# Patient Record
Sex: FEMALE | Race: WHITE | NOT HISPANIC OR LATINO | URBAN - METROPOLITAN AREA
[De-identification: names, ages, dates, MRNs, and addresses within clinical notes are randomized per-mention and may not be internally consistent; named-entity substitution may affect disease eponyms.]

---

## 2018-10-26 ENCOUNTER — EMERGENCY (EMERGENCY)
Facility: HOSPITAL | Age: 53
LOS: 0 days | Discharge: HOME | End: 2018-10-26
Attending: EMERGENCY MEDICINE | Admitting: EMERGENCY MEDICINE

## 2018-10-26 VITALS
TEMPERATURE: 98 F | HEART RATE: 78 BPM | RESPIRATION RATE: 20 BRPM | OXYGEN SATURATION: 100 % | SYSTOLIC BLOOD PRESSURE: 148 MMHG | DIASTOLIC BLOOD PRESSURE: 98 MMHG

## 2018-10-26 VITALS
HEART RATE: 86 BPM | RESPIRATION RATE: 19 BRPM | HEIGHT: 64 IN | WEIGHT: 177.03 LBS | SYSTOLIC BLOOD PRESSURE: 175 MMHG | DIASTOLIC BLOOD PRESSURE: 102 MMHG | TEMPERATURE: 96 F

## 2018-10-26 DIAGNOSIS — E11.9 TYPE 2 DIABETES MELLITUS WITHOUT COMPLICATIONS: ICD-10-CM

## 2018-10-26 DIAGNOSIS — I10 ESSENTIAL (PRIMARY) HYPERTENSION: ICD-10-CM

## 2018-10-26 DIAGNOSIS — Z98.890 OTHER SPECIFIED POSTPROCEDURAL STATES: Chronic | ICD-10-CM

## 2018-10-26 DIAGNOSIS — Z98.890 OTHER SPECIFIED POSTPROCEDURAL STATES: ICD-10-CM

## 2018-10-26 DIAGNOSIS — M54.42 LUMBAGO WITH SCIATICA, LEFT SIDE: ICD-10-CM

## 2018-10-26 DIAGNOSIS — Z79.84 LONG TERM (CURRENT) USE OF ORAL HYPOGLYCEMIC DRUGS: ICD-10-CM

## 2018-10-26 DIAGNOSIS — E03.9 HYPOTHYROIDISM, UNSPECIFIED: ICD-10-CM

## 2018-10-26 DIAGNOSIS — E78.00 PURE HYPERCHOLESTEROLEMIA, UNSPECIFIED: ICD-10-CM

## 2018-10-26 DIAGNOSIS — M53.3 SACROCOCCYGEAL DISORDERS, NOT ELSEWHERE CLASSIFIED: ICD-10-CM

## 2018-10-26 DIAGNOSIS — Z79.899 OTHER LONG TERM (CURRENT) DRUG THERAPY: ICD-10-CM

## 2018-10-26 LAB
APPEARANCE UR: ABNORMAL
BACTERIA # UR AUTO: ABNORMAL
BILIRUB UR-MCNC: NEGATIVE — SIGNIFICANT CHANGE UP
COLOR SPEC: YELLOW — SIGNIFICANT CHANGE UP
COMMENT - URINE: SIGNIFICANT CHANGE UP
DIFF PNL FLD: ABNORMAL
EPI CELLS # UR: ABNORMAL /HPF
GLUCOSE UR QL: 500 MG/DL
GRAN CASTS # UR COMP ASSIST: SIGNIFICANT CHANGE UP /LPF
HYALINE CASTS # UR AUTO: ABNORMAL /LPF
KETONES UR-MCNC: NEGATIVE — SIGNIFICANT CHANGE UP
LEUKOCYTE ESTERASE UR-ACNC: NEGATIVE — SIGNIFICANT CHANGE UP
NITRITE UR-MCNC: NEGATIVE — SIGNIFICANT CHANGE UP
PH UR: 5.5 — SIGNIFICANT CHANGE UP (ref 5–8)
PROT UR-MCNC: 30 MG/DL
RBC CASTS # UR COMP ASSIST: SIGNIFICANT CHANGE UP /HPF
SP GR SPEC: >=1.03 (ref 1.01–1.03)
UROBILINOGEN FLD QL: 0.2 MG/DL — SIGNIFICANT CHANGE UP (ref 0.2–0.2)

## 2018-10-26 RX ORDER — DEXAMETHASONE 0.5 MG/5ML
10 ELIXIR ORAL ONCE
Refills: 0 | Status: COMPLETED | OUTPATIENT
Start: 2018-10-26 | End: 2018-10-26

## 2018-10-26 RX ORDER — TIZANIDINE 4 MG/1
1 TABLET ORAL
Qty: 18 | Refills: 0
Start: 2018-10-26 | End: 2018-10-31

## 2018-10-26 RX ORDER — METHOCARBAMOL 500 MG/1
1000 TABLET, FILM COATED ORAL ONCE
Refills: 0 | Status: COMPLETED | OUTPATIENT
Start: 2018-10-26 | End: 2018-10-26

## 2018-10-26 RX ORDER — KETOROLAC TROMETHAMINE 30 MG/ML
15 SYRINGE (ML) INJECTION ONCE
Refills: 0 | Status: DISCONTINUED | OUTPATIENT
Start: 2018-10-26 | End: 2018-10-26

## 2018-10-26 RX ADMIN — Medication 15 MILLIGRAM(S): at 03:44

## 2018-10-26 RX ADMIN — METHOCARBAMOL 1000 MILLIGRAM(S): 500 TABLET, FILM COATED ORAL at 04:43

## 2018-10-26 RX ADMIN — Medication 10 MILLIGRAM(S): at 03:44

## 2018-10-26 NOTE — ED PROVIDER NOTE - PHYSICAL EXAMINATION
Physical Exam    Vital Signs: I have reviewed the initial vital signs.  Constitutional: well-nourished, appears stated age, no acute distress  Cardiovascular: regular rate, regular rhythm, well-perfused extremities  Respiratory: unlabored respiratory effort, clear to auscultation bilaterally  Musculoskeletal: supple neck, no lower extremity edema. Raising left leg reproduces symptoms with shooting electric like.  Integumentary: warm, dry, no rash  Neurologic: Extremities’ motor and sensory functions grossly intact. Able to ambulate with no gait deficits.

## 2018-10-26 NOTE — ED ADULT NURSE NOTE - PRIMARY CARE PROVIDER
Problem: Safety  Goal: Will remain free from injury    Intervention: Provide assistance with mobility  Pt mobility assessed at beginning of shift, pt requires standby assist to bathroom, steady, WBAT on left arm.  Fall precautions in place, non-slip socks on, bed in lowest, locked position and call light is within reach.  Pt educated to call for assistance and verbalizes understanding.       Problem: Pain Management  Goal: Pain level will decrease to patient's comfort goal    Intervention: Follow pain managment plan developed in collaboration with patient and Interdisciplinary Team  Pain assessment completed, pharmacologic and non-pharmacologic pain management methods in use: rest, repositioned, distraction.            calin

## 2018-10-26 NOTE — ED PROVIDER NOTE - ATTENDING CONTRIBUTION TO CARE
53y female with atraumatic left buttock pain radiating down posterolateral thigh to knee, no numbness/weakness, no bowel/bladder symptoms, on exam ambulatory with antalgic gait, +left sciatic notch ttp, neg slr, abd snt no cvat pulses equal neuro intact, will treat pain, pmd/rehab f/u. Patient counseled regarding conditions which should prompt return.

## 2018-10-26 NOTE — ED PROVIDER NOTE - NS ED ROS FT
Review of Systems    Constitutional: (-) fever (-) weakness (-) diaphoresis   Cardiovascular: (-) chest pain  (-) palpitations  Respiratory: (-) SOB (-) cough   GI: (-) abdominal pain (-) N/V (-) diarrhea  Integumentary: (-) rash (-) redness   : SEE HPI

## 2018-10-31 ENCOUNTER — OUTPATIENT (OUTPATIENT)
Dept: OUTPATIENT SERVICES | Facility: HOSPITAL | Age: 53
LOS: 1 days | Discharge: HOME | End: 2018-10-31

## 2018-10-31 DIAGNOSIS — Z98.890 OTHER SPECIFIED POSTPROCEDURAL STATES: Chronic | ICD-10-CM

## 2018-10-31 DIAGNOSIS — M54.5 LOW BACK PAIN: ICD-10-CM

## 2019-05-28 PROBLEM — Z00.00 ENCOUNTER FOR PREVENTIVE HEALTH EXAMINATION: Noted: 2019-05-28

## 2019-06-06 ENCOUNTER — RECORD ABSTRACTING (OUTPATIENT)
Age: 54
End: 2019-06-06

## 2019-06-06 DIAGNOSIS — Z86.39 PERSONAL HISTORY OF OTHER ENDOCRINE, NUTRITIONAL AND METABOLIC DISEASE: ICD-10-CM

## 2019-06-06 DIAGNOSIS — Z85.850 PERSONAL HISTORY OF MALIGNANT NEOPLASM OF THYROID: ICD-10-CM

## 2019-06-06 DIAGNOSIS — Z86.69 PERSONAL HISTORY OF OTHER DISEASES OF THE NERVOUS SYSTEM AND SENSE ORGANS: ICD-10-CM

## 2019-06-06 DIAGNOSIS — F17.200 NICOTINE DEPENDENCE, UNSPECIFIED, UNCOMPLICATED: ICD-10-CM

## 2019-06-06 RX ORDER — ERGOCALCIFEROL (VITAMIN D2) 1250 MCG
50000 CAPSULE ORAL
Refills: 0 | Status: ACTIVE | COMMUNITY

## 2019-06-27 ENCOUNTER — APPOINTMENT (OUTPATIENT)
Dept: ENDOCRINOLOGY | Facility: CLINIC | Age: 54
End: 2019-06-27

## 2019-07-11 ENCOUNTER — APPOINTMENT (OUTPATIENT)
Dept: ENDOCRINOLOGY | Facility: CLINIC | Age: 54
End: 2019-07-11
Payer: COMMERCIAL

## 2019-07-11 VITALS
SYSTOLIC BLOOD PRESSURE: 122 MMHG | WEIGHT: 182 LBS | DIASTOLIC BLOOD PRESSURE: 72 MMHG | HEIGHT: 64 IN | HEART RATE: 92 BPM | OXYGEN SATURATION: 97 % | BODY MASS INDEX: 31.07 KG/M2

## 2019-07-11 PROCEDURE — 99214 OFFICE O/P EST MOD 30 MIN: CPT

## 2019-07-11 NOTE — ASSESSMENT
[FreeTextEntry1] :  Pt. has stable low reading for thyroglobulin level. TSH is mildly suppressed with normal T4 and T3 and pt. is clinically euthyroid. Risks of subclinical hyperthyroidism (BMD, Cardiac etc.) have been discussed in detail and pt. is aware. Thyroid U/S has been negative. Will continue with current dose and follow thyroglobulin level and TFTs.\par \par  Pt. seen and examined and had discussion regarding diabetic complication risks (eye exam, examining feet). Labs were fully reviewed with pt. including FBS, HbA1c, and micro albumin and lipids. \par \par Values are adequate and no new symptoms of concern (discussed neuropathy symptoms, C/p, cardiac issues and GI). To stay with current regimen. \par

## 2019-08-10 ENCOUNTER — APPOINTMENT (OUTPATIENT)
Dept: ENDOCRINOLOGY | Facility: CLINIC | Age: 54
End: 2019-08-10
Payer: COMMERCIAL

## 2019-08-10 VITALS
BODY MASS INDEX: 30.98 KG/M2 | WEIGHT: 181.44 LBS | HEIGHT: 64 IN | SYSTOLIC BLOOD PRESSURE: 120 MMHG | HEART RATE: 82 BPM | DIASTOLIC BLOOD PRESSURE: 79 MMHG

## 2019-08-10 PROCEDURE — 99214 OFFICE O/P EST MOD 30 MIN: CPT

## 2019-08-10 NOTE — ASSESSMENT
[FreeTextEntry1] : \par \par  Pt. seen and examined and had discussion regarding diabetic complication risks (eye exam, examining feet). Labs were fully reviewed with pt. including FBS, HbA1c, and micro albumin and lipids. \par Values are adequate and no new symptoms of concern (discussed neuropathy symptoms, C/p, cardiac issues and GI). To stay with current regimen. \par  Pt. has stable low reading for thyroglobulin level. TSH is mildly suppressed with normal T4 and T3 and pt. is clinically euthyroid. Risks of subclinical hyperthyroidism (BMD, Cardiac etc.) have been discussed in detail and pt. is aware. Thyroid U/S has been negative. Will continue with current dose and follow thyroglobulin level and TFTs.

## 2019-08-10 NOTE — PHYSICAL EXAM
[Alert] : alert [No Acute Distress] : no acute distress [Well Developed] : well developed [Well Nourished] : well nourished [EOMI] : extra ocular movement intact [Normal Sclera/Conjunctiva] : normal sclera/conjunctiva [No Proptosis] : no proptosis [Normal Oropharynx] : the oropharynx was normal [No Thyroid Nodules] : there were no palpable thyroid nodules [Thyroid Not Enlarged] : the thyroid was not enlarged [No Accessory Muscle Use] : no accessory muscle use [No Respiratory Distress] : no respiratory distress [Clear to Auscultation] : lungs were clear to auscultation bilaterally [Normal Rate] : heart rate was normal  [Normal S1, S2] : normal S1 and S2 [Regular Rhythm] : with a regular rhythm [Pedal Pulses Normal] : the pedal pulses are present [No Edema] : there was no peripheral edema [Normal Bowel Sounds] : normal bowel sounds [Not Tender] : non-tender [Not Distended] : not distended [Soft] : abdomen soft [Post Cervical Nodes] : posterior cervical nodes [Anterior Cervical Nodes] : anterior cervical nodes [Axillary Nodes] : axillary nodes [No Spinal Tenderness] : no spinal tenderness [Normal] : normal and non tender [No Stigmata of Cushings Syndrome] : no stigmata of cushings syndrome [Spine Straight] : spine straight [Normal Strength/Tone] : muscle strength and tone were normal [Normal Gait] : normal gait [No Rash] : no rash [Normal Reflexes] : deep tendon reflexes were 2+ and symmetric [No Tremors] : no tremors [Oriented x3] : oriented to person, place, and time [Acanthosis Nigricans] : no acanthosis nigricans

## 2019-08-10 NOTE — HISTORY OF PRESENT ILLNESS
[FreeTextEntry1] : Pt main concern is her hair loss. Her primary lowered synthroid to .137 but no change. Pt got Bx from [facility] and await result but aooears like Alopecia Areata. Pt already with vitiiligo.

## 2020-02-06 ENCOUNTER — APPOINTMENT (OUTPATIENT)
Dept: ENDOCRINOLOGY | Facility: CLINIC | Age: 55
End: 2020-02-06
Payer: MEDICARE

## 2020-02-06 VITALS
HEIGHT: 64 IN | SYSTOLIC BLOOD PRESSURE: 120 MMHG | DIASTOLIC BLOOD PRESSURE: 72 MMHG | OXYGEN SATURATION: 97 % | HEART RATE: 86 BPM | WEIGHT: 184 LBS | BODY MASS INDEX: 31.41 KG/M2

## 2020-02-06 PROCEDURE — 99214 OFFICE O/P EST MOD 30 MIN: CPT

## 2020-04-09 NOTE — PHYSICAL EXAM
[Alert] : alert [Well Nourished] : well nourished [No Acute Distress] : no acute distress [Well Developed] : well developed [EOMI] : extra ocular movement intact [Normal Sclera/Conjunctiva] : normal sclera/conjunctiva [Normal Oropharynx] : the oropharynx was normal [No Proptosis] : no proptosis [Thyroid Not Enlarged] : the thyroid was not enlarged [No Respiratory Distress] : no respiratory distress [No Thyroid Nodules] : there were no palpable thyroid nodules [No Accessory Muscle Use] : no accessory muscle use [Clear to Auscultation] : lungs were clear to auscultation bilaterally [Normal Rate] : heart rate was normal  [Normal S1, S2] : normal S1 and S2 [Regular Rhythm] : with a regular rhythm [Pedal Pulses Normal] : the pedal pulses are present [No Edema] : there was no peripheral edema [Normal Bowel Sounds] : normal bowel sounds [Soft] : abdomen soft [Not Distended] : not distended [Not Tender] : non-tender [Anterior Cervical Nodes] : anterior cervical nodes [Post Cervical Nodes] : posterior cervical nodes [Normal] : normal and non tender [Axillary Nodes] : axillary nodes [No Spinal Tenderness] : no spinal tenderness [No Stigmata of Cushings Syndrome] : no stigmata of cushings syndrome [Spine Straight] : spine straight [Normal Strength/Tone] : muscle strength and tone were normal [Normal Gait] : normal gait [No Rash] : no rash [Normal Reflexes] : deep tendon reflexes were 2+ and symmetric [No Tremors] : no tremors [Oriented x3] : oriented to person, place, and time [Acanthosis Nigricans] : no acanthosis nigricans

## 2020-04-09 NOTE — ASSESSMENT
[FreeTextEntry1] : Pt. has stable low reading for thyroglobulin level but not done on this lab so will need to separately order.TSH is low normal and would prefer mild suppression with normal T4 and T3 and pt. is clinically euthyroid. Risks of subclinical hyperthyroidism (BMD, Cardiac etc.) have been discussed in detail and pt. is aware. Thyroid U/S has new nodule in the bed on the left so will need FNA.. Will continue with current dose and follow thyroglobulin level and TFTs.\par Diabetes control is poor. suggest BID glimepiride.

## 2020-08-06 ENCOUNTER — APPOINTMENT (OUTPATIENT)
Dept: ENDOCRINOLOGY | Facility: CLINIC | Age: 55
End: 2020-08-06

## 2020-10-26 ENCOUNTER — APPOINTMENT (OUTPATIENT)
Dept: ENDOCRINOLOGY | Facility: CLINIC | Age: 55
End: 2020-10-26
Payer: MEDICARE

## 2020-10-26 VITALS
BODY MASS INDEX: 32.27 KG/M2 | HEART RATE: 8 BPM | TEMPERATURE: 97.2 F | DIASTOLIC BLOOD PRESSURE: 72 MMHG | OXYGEN SATURATION: 98 % | SYSTOLIC BLOOD PRESSURE: 122 MMHG | WEIGHT: 189 LBS | HEIGHT: 64 IN

## 2020-10-26 PROCEDURE — 99214 OFFICE O/P EST MOD 30 MIN: CPT

## 2020-10-26 RX ORDER — LISINOPRIL 5 MG/1
5 TABLET ORAL
Qty: 30 | Refills: 0 | Status: ACTIVE | COMMUNITY
Start: 2020-07-20

## 2020-10-26 RX ORDER — ATORVASTATIN CALCIUM 20 MG/1
20 TABLET, FILM COATED ORAL
Qty: 30 | Refills: 0 | Status: ACTIVE | COMMUNITY
Start: 2020-09-22

## 2020-10-26 NOTE — ASSESSMENT
[FreeTextEntry1] : The pt. has history of thyroid carcinoma with total thyroidectomy. Pt. has been followed with thyroglobulin with most recent level  1.0 and imaging with U/S (2/6/2020 which showed new growth in L bed of area of previous partial thyroidectomy and then recheck on 220/2020 which did not reveal anything that could be biopsied. . . Goal has been to suppress TSH with normal T4 and T3 and clinical euthyroid state. Risks of subclinical hyperthyroid reviewed in great detail including BMD and cardiac issues.\par

## 2020-12-08 ENCOUNTER — RESULT REVIEW (OUTPATIENT)
Age: 55
End: 2020-12-08

## 2021-01-03 ENCOUNTER — INPATIENT (INPATIENT)
Facility: HOSPITAL | Age: 56
LOS: 3 days | Discharge: HOME | End: 2021-01-07
Attending: SURGERY | Admitting: SURGERY
Payer: MEDICARE

## 2021-01-03 VITALS
DIASTOLIC BLOOD PRESSURE: 90 MMHG | WEIGHT: 182.98 LBS | HEIGHT: 64 IN | RESPIRATION RATE: 18 BRPM | SYSTOLIC BLOOD PRESSURE: 190 MMHG | HEART RATE: 91 BPM | OXYGEN SATURATION: 99 % | TEMPERATURE: 98 F

## 2021-01-03 DIAGNOSIS — Z98.890 OTHER SPECIFIED POSTPROCEDURAL STATES: Chronic | ICD-10-CM

## 2021-01-03 LAB
ALBUMIN SERPL ELPH-MCNC: 4.6 G/DL — SIGNIFICANT CHANGE UP (ref 3.5–5.2)
ALP SERPL-CCNC: 74 U/L — SIGNIFICANT CHANGE UP (ref 30–115)
ALT FLD-CCNC: 35 U/L — SIGNIFICANT CHANGE UP (ref 0–41)
ANION GAP SERPL CALC-SCNC: 13 MMOL/L — SIGNIFICANT CHANGE UP (ref 7–14)
APPEARANCE UR: CLEAR — SIGNIFICANT CHANGE UP
AST SERPL-CCNC: 29 U/L — SIGNIFICANT CHANGE UP (ref 0–41)
BACTERIA # UR AUTO: ABNORMAL /HPF
BASOPHILS # BLD AUTO: 0.05 K/UL — SIGNIFICANT CHANGE UP (ref 0–0.2)
BASOPHILS NFR BLD AUTO: 0.8 % — SIGNIFICANT CHANGE UP (ref 0–1)
BILIRUB DIRECT SERPL-MCNC: <0.2 MG/DL — SIGNIFICANT CHANGE UP (ref 0–0.2)
BILIRUB INDIRECT FLD-MCNC: >0.7 MG/DL — SIGNIFICANT CHANGE UP (ref 0.2–1.2)
BILIRUB SERPL-MCNC: 0.9 MG/DL — SIGNIFICANT CHANGE UP (ref 0.2–1.2)
BILIRUB UR-MCNC: NEGATIVE — SIGNIFICANT CHANGE UP
BUN SERPL-MCNC: 21 MG/DL — HIGH (ref 10–20)
CALCIUM SERPL-MCNC: 9.8 MG/DL — SIGNIFICANT CHANGE UP (ref 8.5–10.1)
CHLORIDE SERPL-SCNC: 98 MMOL/L — SIGNIFICANT CHANGE UP (ref 98–110)
CO2 SERPL-SCNC: 28 MMOL/L — SIGNIFICANT CHANGE UP (ref 17–32)
COD CRY URNS QL: NEGATIVE — SIGNIFICANT CHANGE UP
COLOR SPEC: YELLOW — SIGNIFICANT CHANGE UP
CREAT SERPL-MCNC: 1.2 MG/DL — SIGNIFICANT CHANGE UP (ref 0.7–1.5)
DIFF PNL FLD: ABNORMAL
EOSINOPHIL # BLD AUTO: 0.2 K/UL — SIGNIFICANT CHANGE UP (ref 0–0.7)
EOSINOPHIL NFR BLD AUTO: 3.1 % — SIGNIFICANT CHANGE UP (ref 0–8)
EPI CELLS # UR: ABNORMAL /HPF
GLUCOSE SERPL-MCNC: 212 MG/DL — HIGH (ref 70–99)
GLUCOSE UR QL: NEGATIVE MG/DL — SIGNIFICANT CHANGE UP
GRAN CASTS # UR COMP ASSIST: NEGATIVE — SIGNIFICANT CHANGE UP
HCT VFR BLD CALC: 38.3 % — SIGNIFICANT CHANGE UP (ref 37–47)
HGB BLD-MCNC: 12.7 G/DL — SIGNIFICANT CHANGE UP (ref 12–16)
HYALINE CASTS # UR AUTO: NEGATIVE — SIGNIFICANT CHANGE UP
IMM GRANULOCYTES NFR BLD AUTO: 0.5 % — HIGH (ref 0.1–0.3)
KETONES UR-MCNC: NEGATIVE — SIGNIFICANT CHANGE UP
LACTATE SERPL-SCNC: 2.5 MMOL/L — HIGH (ref 0.7–2)
LEUKOCYTE ESTERASE UR-ACNC: NEGATIVE — SIGNIFICANT CHANGE UP
LIDOCAIN IGE QN: 30 U/L — SIGNIFICANT CHANGE UP (ref 7–60)
LYMPHOCYTES # BLD AUTO: 1.23 K/UL — SIGNIFICANT CHANGE UP (ref 1.2–3.4)
LYMPHOCYTES # BLD AUTO: 19.2 % — LOW (ref 20.5–51.1)
MCHC RBC-ENTMCNC: 28.3 PG — SIGNIFICANT CHANGE UP (ref 27–31)
MCHC RBC-ENTMCNC: 33.2 G/DL — SIGNIFICANT CHANGE UP (ref 32–37)
MCV RBC AUTO: 85.3 FL — SIGNIFICANT CHANGE UP (ref 81–99)
MONOCYTES # BLD AUTO: 0.48 K/UL — SIGNIFICANT CHANGE UP (ref 0.1–0.6)
MONOCYTES NFR BLD AUTO: 7.5 % — SIGNIFICANT CHANGE UP (ref 1.7–9.3)
NEUTROPHILS # BLD AUTO: 4.43 K/UL — SIGNIFICANT CHANGE UP (ref 1.4–6.5)
NEUTROPHILS NFR BLD AUTO: 68.9 % — SIGNIFICANT CHANGE UP (ref 42.2–75.2)
NITRITE UR-MCNC: NEGATIVE — SIGNIFICANT CHANGE UP
NRBC # BLD: 0 /100 WBCS — SIGNIFICANT CHANGE UP (ref 0–0)
PH UR: 5.5 — SIGNIFICANT CHANGE UP (ref 5–8)
PLATELET # BLD AUTO: 177 K/UL — SIGNIFICANT CHANGE UP (ref 130–400)
POTASSIUM SERPL-MCNC: 3.9 MMOL/L — SIGNIFICANT CHANGE UP (ref 3.5–5)
POTASSIUM SERPL-SCNC: 3.9 MMOL/L — SIGNIFICANT CHANGE UP (ref 3.5–5)
PROT SERPL-MCNC: 7.8 G/DL — SIGNIFICANT CHANGE UP (ref 6–8)
PROT UR-MCNC: 100 MG/DL
RBC # BLD: 4.49 M/UL — SIGNIFICANT CHANGE UP (ref 4.2–5.4)
RBC # FLD: 13.5 % — SIGNIFICANT CHANGE UP (ref 11.5–14.5)
RBC CASTS # UR COMP ASSIST: ABNORMAL /HPF
SODIUM SERPL-SCNC: 139 MMOL/L — SIGNIFICANT CHANGE UP (ref 135–146)
SP GR SPEC: >=1.03 (ref 1.01–1.03)
TRI-PHOS CRY UR QL COMP ASSIST: NEGATIVE — SIGNIFICANT CHANGE UP
TROPONIN T SERPL-MCNC: <0.01 NG/ML — SIGNIFICANT CHANGE UP
URATE CRY FLD QL MICRO: ABNORMAL /HPF
UROBILINOGEN FLD QL: 0.2 MG/DL — SIGNIFICANT CHANGE UP (ref 0.2–0.2)
WBC # BLD: 6.42 K/UL — SIGNIFICANT CHANGE UP (ref 4.8–10.8)
WBC # FLD AUTO: 6.42 K/UL — SIGNIFICANT CHANGE UP (ref 4.8–10.8)
WBC UR QL: SIGNIFICANT CHANGE UP /HPF

## 2021-01-03 PROCEDURE — 76705 ECHO EXAM OF ABDOMEN: CPT | Mod: 26

## 2021-01-03 PROCEDURE — 74177 CT ABD & PELVIS W/CONTRAST: CPT | Mod: 26

## 2021-01-03 PROCEDURE — 71046 X-RAY EXAM CHEST 2 VIEWS: CPT | Mod: 26

## 2021-01-03 PROCEDURE — 99285 EMERGENCY DEPT VISIT HI MDM: CPT

## 2021-01-03 RX ORDER — TAMSULOSIN HYDROCHLORIDE 0.4 MG/1
0.4 CAPSULE ORAL ONCE
Refills: 0 | Status: COMPLETED | OUTPATIENT
Start: 2021-01-03 | End: 2021-01-03

## 2021-01-03 RX ORDER — KETOROLAC TROMETHAMINE 30 MG/ML
30 SYRINGE (ML) INJECTION ONCE
Refills: 0 | Status: DISCONTINUED | OUTPATIENT
Start: 2021-01-03 | End: 2021-01-03

## 2021-01-03 RX ORDER — ONDANSETRON 8 MG/1
4 TABLET, FILM COATED ORAL ONCE
Refills: 0 | Status: COMPLETED | OUTPATIENT
Start: 2021-01-03 | End: 2021-01-03

## 2021-01-03 RX ORDER — CEFTRIAXONE 500 MG/1
1000 INJECTION, POWDER, FOR SOLUTION INTRAMUSCULAR; INTRAVENOUS ONCE
Refills: 0 | Status: COMPLETED | OUTPATIENT
Start: 2021-01-03 | End: 2021-01-03

## 2021-01-03 RX ORDER — MORPHINE SULFATE 50 MG/1
4 CAPSULE, EXTENDED RELEASE ORAL ONCE
Refills: 0 | Status: DISCONTINUED | OUTPATIENT
Start: 2021-01-03 | End: 2021-01-03

## 2021-01-03 RX ADMIN — Medication 30 MILLIGRAM(S): at 20:28

## 2021-01-03 RX ADMIN — CEFTRIAXONE 1000 MILLIGRAM(S): 500 INJECTION, POWDER, FOR SOLUTION INTRAMUSCULAR; INTRAVENOUS at 20:30

## 2021-01-03 RX ADMIN — Medication 30 MILLIGRAM(S): at 18:42

## 2021-01-03 RX ADMIN — MORPHINE SULFATE 4 MILLIGRAM(S): 50 CAPSULE, EXTENDED RELEASE ORAL at 18:05

## 2021-01-03 RX ADMIN — MORPHINE SULFATE 4 MILLIGRAM(S): 50 CAPSULE, EXTENDED RELEASE ORAL at 17:45

## 2021-01-03 RX ADMIN — TAMSULOSIN HYDROCHLORIDE 0.4 MILLIGRAM(S): 0.4 CAPSULE ORAL at 23:55

## 2021-01-03 RX ADMIN — CEFTRIAXONE 100 MILLIGRAM(S): 500 INJECTION, POWDER, FOR SOLUTION INTRAMUSCULAR; INTRAVENOUS at 20:40

## 2021-01-03 RX ADMIN — ONDANSETRON 4 MILLIGRAM(S): 8 TABLET, FILM COATED ORAL at 17:45

## 2021-01-03 NOTE — CONSULT NOTE ADULT - SUBJECTIVE AND OBJECTIVE BOX
· HPI Objective Statement: 56 y/o female with a PMH of hypothyroidism, thyroid ca- s/p partial thyroidectomy , DM, HTN, and hypercholesterolemia, and right kidney stone presents to the ED for evaluation of sharp constant ruq pain that began an hour prior to arrival while sitting down. pt reports hx of appendectomy. pt never had endoscopy. pt last colonoscopy about a year ago with dr. macdonald. pt reports nausea, and sweating. pt denies fever, chills, vomiting, diarrhea, urinary symptoms, back pain, chest pain, sob, weakness, or rashes.    HIV:    HIV Test Questions:  · In accordance with [place] State law, we offer every patient who comes to our ED an HIV test. Would you like to be tested today?	Opt out    PAST MEDICAL/SURGICAL/FAMILY/SOCIAL HISTORY:    Past Medical History:  DM (diabetes mellitus)    High cholesterol    HTN (hypertension)    Hypothyroid    Thyroid cancer.     Past Surgical History:  H/O partial thyroidectomy.  appendectomy     Tobacco Usage:  · Tobacco Usage	Never smoker     ALLERGIES AND HOME MEDICATIONS:   Allergies:        Allergies:  	No Known Allergies:     Home Medications:   * Incomplete Medication History as of 26-Oct-2018 03:14 documented in Structured Notes  · 	tiZANidine 2 mg oral capsule: 1 cap(s) orally every 8 hours, As Needed for pain  · 	metFORMIN:   · 	lisinopril:   · 	atorvastatin:   · 	glimepiride:   · 	levothyroxine:     REVIEW OF SYSTEMS:    Review of Systems:  · Review of Systems: CONST: No fever, chills or bodyaches  	EYES: No pain, redness, drainage or visual changes.  	ENT: No ear pain or discharge, nasal discharge or congestion. No sore throat  	CARD: No chest pain, palpitations  	RESP: No SOB, cough, hemoptysis. No hx of asthma or COPD  	GI: (+) ruq abdominal pain, and nausea. No V/D  	: No urinary symptoms  	MS: No joint pain, back pain or extremity pain/injury  	SKIN: No rashes  NEURO: No headache, dizziness, paresthesias or LOC    PHYSICAL EXAM:   · Physical Examination: Physical Exam    	Vital Signs: I have reviewed the initial vital signs.  	Constitutional: well-nourished, appears stated age, no acute distress  	Eyes: Conjunctiva pink, Sclera clear  	Cardiovascular: S1 and S2, regular rate, regular rhythm, well-perfused extremities, radial pulses equal and 2+ b/l.   	Respiratory: unlabored respiratory effort, clear to auscultation bilaterally no wheezing, rales and rhonchi. pt is speaking full sentences. no accessory muscle use.   	Gastrointestinal: soft, (+) ruq tenderness, nondistended abdomen, no pulsatile mass, normal bowl sounds, no rebound, no guarding, negative psoas, negative obturator, (+) murphys. no organomegaly. no cva tenderness.   	Musculoskeletal: supple neck, no lower extremity edema, no calf tenderness  	Integumentary: warm, dry, no rash  	Neurologic: awake, alert  Psychiatric: appropriate mood, appropriate affect                        12.7   6.42  )-----------( 177      ( 03 Jan 2021 17:30 )             38.3   01-03    139  |  98  |  21<H>  ----------------------------<  212<H>  3.9   |  28  |  1.2    Ca    9.8      03 Jan 2021 17:30    TPro  7.8  /  Alb  4.6  /  TBili  0.9  /  DBili  <0.2  /  AST  29  /  ALT  35  /  AlkPhos  74  01-03  EXAM:  US ABDOMEN LIMITED            PROCEDURE DATE:  01/03/2021            INTERPRETATION:  CLINICAL INFORMATION: Right upper quadrant pain.    COMPARISON: None available.    TECHNIQUE: Sonography of the right upper quadrant.    FINDINGS:    Liver: Increased echogenicity.  Bile ducts: Normal caliber. Common bile duct measures 5 (mm).  Gallbladder: Gallbladder sludge. No gallbladder wall thickness or pericholecystic fluid. Negative sonographic Kaur's sign.  Pancreas: Visualized portions are within normal limits.  Right kidney: 12.3 cm. minimal dilatation of the collecting system. Right renal upper pole calculi measuring 0.3 cm.  Ascites: None.  IVC: Visualized portions are within normal limits.    IMPRESSION:    Gallbladder sludge with no sonographic evidence of acute cholecystitis.    Hepatic steatosis.    Right renal upper pole stone measuring 0.3 cm. Minimal dilatation of the collecting system.              ALYSSA NASH M.D., RESIDENT RADIOLOGIST  This document has been electronically signed.  JOHANA HERNANDEZ MD; Attending Radiologist  This document has been electronically signed. Jesús  3 2021  7:44PM

## 2021-01-03 NOTE — ED ADULT TRIAGE NOTE - CHIEF COMPLAINT QUOTE
Patient complaining of right sided abdominal pain that started one hour PTA accompanied by nausea and vomiting.

## 2021-01-03 NOTE — ED ADULT NURSE NOTE - PMH
DM (diabetes mellitus)    High cholesterol    HTN (hypertension)    Hypothyroid    Thyroid cancer

## 2021-01-03 NOTE — ED PROVIDER NOTE - OBJECTIVE STATEMENT
56 y/o female with a PMH of hypothyroidism, thyroid ca, DM, HTN, and hypercholesterolemia, and right kidney stone presents to the ED for evaluation of sharp constant ruq pain that began an hour prior to arrival while sitting down. pt reports hx f appendectomy. pt never had endoscopy. pt last colonoscopy about a year ago with dr. macdonald. pt reports nausea, and sweating. pt denies fever, chills, vomiting, diarrhea, urinary symptoms, back pain, chest pain, sob, weakness, or rashes.

## 2021-01-03 NOTE — ED PROVIDER NOTE - PROGRESS NOTE DETAILS
ATTENDING NOTE: I personally evaluated the patient. I reviewed the Resident’s or Physician Assistant’s note (as assigned above), and agree with the findings and plan except as documented in my note.  54 y/o F with PMHx HLD, DM, hypothyroid and thyroid CA presents with right sided abdominal pain, associated with nausea and 2 episodes of vomiting since this afternoon. She describes pain as intermittent, sometimes sharp and non-radiating. She states she noticed blood in her urine 1 month ago and followed up with out-patient urology who told her she had a 2mm kidney stone after having a CT. She denies urinary sxs today. No fever, chills, cough, SOB, CP or diarrhea. No new foods. PE: On exam pt in NAD, AAO x3, PERRL, EOMI, no lad, neck supple, OP clear, MMM, Lungs CTA B/L, no wrr, no mur, Abd is soft, + BS, (+) TTP RUQ and RLQ, (+) Kaur’s sign, ND, no edema, good ROM x all ext, no rash. ATTENDING NOTE: I personally evaluated the patient. I reviewed the Resident’s or Physician Assistant’s note (as assigned above), and agree with the findings and plan except as documented in my note.  54 y/o F with PMHx HLD, DM, hypothyroid and thyroid CA presents with right sided abdominal pain, associated with nausea and 2 episodes of vomiting since this afternoon. She describes pain as intermittent, sometimes sharp and non-radiating. She states she noticed blood in her urine 1 month ago and followed up with out-patient urology who told her she had a 2mm kidney stone after having a CT. She denies urinary sxs today. No fever, chills, cough, SOB, CP or diarrhea. No new foods. PE: On exam pt in NAD, AAO x3, PERRL, EOMI, no lad, neck supple, OP clear, MMM, Lungs CTA B/L, no wrr, no mur, Abd is soft, + BS, (+) TTP RUQ and RLQ, (+) Kaur’s sign, healed surgical scar noted, ND, no edema, good ROM x all ext, no rash. FF: spoke with surg pa humera, states he spoke to florecita and florecita will schedule pt tmo morning for cholecystectomy. GIO: patient signed out to me from TAM[facility] Gandara, here with abd pain, +biliary colic, plan for OR tomorrow for cholecystectomy. patient also diagnosed with kidney stone on 12/8, with UTI today. ct abd/pelvis pending to determine presence of stone as well. patient currently resting comfortably, no complaints at this time. will continue to monitor.

## 2021-01-03 NOTE — ED PROVIDER NOTE - ATTENDING CONTRIBUTION TO CARE
ATTENDING NOTE: I personally evaluated the patient. I reviewed the Resident’s or Physician Assistant’s note (as assigned above), and agree with the findings and plan except as documented in my note.  54 y/o F with PMHx HLD, DM, hypothyroid and thyroid CA presents with right sided abdominal pain, associated with nausea and 2 episodes of vomiting since this afternoon. She describes pain as intermittent, sometimes sharp and non-radiating. Pt also recently diagnosed with kidney stone. She denies urinary sxs today. No fever, chills, cough, SOB, CP or diarrhea. No new foods.  On exam pt in NAD, AAO x3, PERRL, EOMI, no lad, neck supple, OP clear, MMM, Lungs CTA B/L, no wrr, no mur, Abd is soft, + BS, (+) TTP RUQ and RLQ, (+) Kaur’s sign,  surgical scar noted RLQ, ND, no edema, good ROM x all ext, no rash.

## 2021-01-03 NOTE — ED PROVIDER NOTE - PHYSICAL EXAMINATION
Physical Exam    Vital Signs: I have reviewed the initial vital signs.  Constitutional: well-nourished, appears stated age, no acute distress  Eyes: Conjunctiva pink, Sclera clear  Cardiovascular: S1 and S2, regular rate, regular rhythm, well-perfused extremities, radial pulses equal and 2+ b/l.   Respiratory: unlabored respiratory effort, clear to auscultation bilaterally no wheezing, rales and rhonchi. pt is speaking full sentences. no accessory muscle use.   Gastrointestinal: soft, (+) ruq tenderness, nondistended abdomen, no pulsatile mass, normal bowl sounds, no rebound, no guarding, negative psoas, negative obturator, (+) murphys. no organomegaly. no cva tenderness.   Musculoskeletal: supple neck, no lower extremity edema, no calf tenderness  Integumentary: warm, dry, no rash  Neurologic: awake, alert  Psychiatric: appropriate mood, appropriate affect

## 2021-01-03 NOTE — ED PROVIDER NOTE - NS ED ROS FT
CONST: No fever, chills or bodyaches  EYES: No pain, redness, drainage or visual changes.  ENT: No ear pain or discharge, nasal discharge or congestion. No sore throat  CARD: No chest pain, palpitations  RESP: No SOB, cough, hemoptysis. No hx of asthma or COPD  GI: (+) ruq abdominal pain, and nausea. No V/D  : No urinary symptoms  MS: No joint pain, back pain or extremity pain/injury  SKIN: No rashes  NEURO: No headache, dizziness, paresthesias or LOC

## 2021-01-03 NOTE — ED PROVIDER NOTE - CLINICAL SUMMARY MEDICAL DECISION MAKING FREE TEXT BOX
Pt with abd pain, found with sludge on sono.  pain improved with pain meds.  Urine also noted with know h/o stone, CT Pt with abd pain, found with sludge on sono.  pain improved with pain meds.  Urine also noted with know h/o stone, CT obtained.  Stone confirmed at [facility]. Will give Flomax.  Pt is aware of pulmonary nodule.  Follows with her doctor and had recent CT scan of her chest.

## 2021-01-04 DIAGNOSIS — Z90.49 ACQUIRED ABSENCE OF OTHER SPECIFIED PARTS OF DIGESTIVE TRACT: Chronic | ICD-10-CM

## 2021-01-04 DIAGNOSIS — N20.1 CALCULUS OF URETER: ICD-10-CM

## 2021-01-04 DIAGNOSIS — K82.8 OTHER SPECIFIED DISEASES OF GALLBLADDER: ICD-10-CM

## 2021-01-04 LAB
ALBUMIN SERPL ELPH-MCNC: 4.2 G/DL — SIGNIFICANT CHANGE UP (ref 3.5–5.2)
ALP SERPL-CCNC: 61 U/L — SIGNIFICANT CHANGE UP (ref 30–115)
ALT FLD-CCNC: 29 U/L — SIGNIFICANT CHANGE UP (ref 0–41)
ANION GAP SERPL CALC-SCNC: 10 MMOL/L — SIGNIFICANT CHANGE UP (ref 7–14)
AST SERPL-CCNC: 27 U/L — SIGNIFICANT CHANGE UP (ref 0–41)
BASOPHILS # BLD AUTO: 0.03 K/UL — SIGNIFICANT CHANGE UP (ref 0–0.2)
BASOPHILS NFR BLD AUTO: 0.5 % — SIGNIFICANT CHANGE UP (ref 0–1)
BILIRUB DIRECT SERPL-MCNC: <0.2 MG/DL — SIGNIFICANT CHANGE UP (ref 0–0.2)
BILIRUB INDIRECT FLD-MCNC: >0.7 MG/DL — SIGNIFICANT CHANGE UP (ref 0.2–1.2)
BILIRUB SERPL-MCNC: 0.9 MG/DL — SIGNIFICANT CHANGE UP (ref 0.2–1.2)
BUN SERPL-MCNC: 29 MG/DL — HIGH (ref 10–20)
CALCIUM SERPL-MCNC: 9.3 MG/DL — SIGNIFICANT CHANGE UP (ref 8.5–10.1)
CHLORIDE SERPL-SCNC: 100 MMOL/L — SIGNIFICANT CHANGE UP (ref 98–110)
CO2 SERPL-SCNC: 27 MMOL/L — SIGNIFICANT CHANGE UP (ref 17–32)
CREAT SERPL-MCNC: 1.2 MG/DL — SIGNIFICANT CHANGE UP (ref 0.7–1.5)
CULTURE RESULTS: NO GROWTH — SIGNIFICANT CHANGE UP
EOSINOPHIL # BLD AUTO: 0.2 K/UL — SIGNIFICANT CHANGE UP (ref 0–0.7)
EOSINOPHIL NFR BLD AUTO: 3.3 % — SIGNIFICANT CHANGE UP (ref 0–8)
GLUCOSE BLDC GLUCOMTR-MCNC: 199 MG/DL — HIGH (ref 70–99)
GLUCOSE BLDC GLUCOMTR-MCNC: 200 MG/DL — HIGH (ref 70–99)
GLUCOSE SERPL-MCNC: 213 MG/DL — HIGH (ref 70–99)
HCT VFR BLD CALC: 35.1 % — LOW (ref 37–47)
HCV AB S/CO SERPL IA: 0.04 COI — SIGNIFICANT CHANGE UP
HCV AB SERPL-IMP: SIGNIFICANT CHANGE UP
HGB BLD-MCNC: 11.5 G/DL — LOW (ref 12–16)
IMM GRANULOCYTES NFR BLD AUTO: 0.3 % — SIGNIFICANT CHANGE UP (ref 0.1–0.3)
LYMPHOCYTES # BLD AUTO: 0.73 K/UL — LOW (ref 1.2–3.4)
LYMPHOCYTES # BLD AUTO: 12.1 % — LOW (ref 20.5–51.1)
MAGNESIUM SERPL-MCNC: 1.7 MG/DL — LOW (ref 1.8–2.4)
MCHC RBC-ENTMCNC: 28.6 PG — SIGNIFICANT CHANGE UP (ref 27–31)
MCHC RBC-ENTMCNC: 32.8 G/DL — SIGNIFICANT CHANGE UP (ref 32–37)
MCV RBC AUTO: 87.3 FL — SIGNIFICANT CHANGE UP (ref 81–99)
MONOCYTES # BLD AUTO: 0.52 K/UL — SIGNIFICANT CHANGE UP (ref 0.1–0.6)
MONOCYTES NFR BLD AUTO: 8.6 % — SIGNIFICANT CHANGE UP (ref 1.7–9.3)
NEUTROPHILS # BLD AUTO: 4.54 K/UL — SIGNIFICANT CHANGE UP (ref 1.4–6.5)
NEUTROPHILS NFR BLD AUTO: 75.2 % — SIGNIFICANT CHANGE UP (ref 42.2–75.2)
NRBC # BLD: 0 /100 WBCS — SIGNIFICANT CHANGE UP (ref 0–0)
PLATELET # BLD AUTO: 131 K/UL — SIGNIFICANT CHANGE UP (ref 130–400)
POTASSIUM SERPL-MCNC: 4 MMOL/L — SIGNIFICANT CHANGE UP (ref 3.5–5)
POTASSIUM SERPL-SCNC: 4 MMOL/L — SIGNIFICANT CHANGE UP (ref 3.5–5)
PROT SERPL-MCNC: 6.9 G/DL — SIGNIFICANT CHANGE UP (ref 6–8)
RBC # BLD: 4.02 M/UL — LOW (ref 4.2–5.4)
RBC # FLD: 13.6 % — SIGNIFICANT CHANGE UP (ref 11.5–14.5)
SODIUM SERPL-SCNC: 137 MMOL/L — SIGNIFICANT CHANGE UP (ref 135–146)
SPECIMEN SOURCE: SIGNIFICANT CHANGE UP
WBC # BLD: 6.04 K/UL — SIGNIFICANT CHANGE UP (ref 4.8–10.8)
WBC # FLD AUTO: 6.04 K/UL — SIGNIFICANT CHANGE UP (ref 4.8–10.8)

## 2021-01-04 PROCEDURE — 74018 RADEX ABDOMEN 1 VIEW: CPT | Mod: 26

## 2021-01-04 PROCEDURE — 78227 HEPATOBIL SYST IMAGE W/DRUG: CPT | Mod: 26

## 2021-01-04 PROCEDURE — 99223 1ST HOSP IP/OBS HIGH 75: CPT

## 2021-01-04 RX ORDER — CIPROFLOXACIN LACTATE 400MG/40ML
400 VIAL (ML) INTRAVENOUS EVERY 12 HOURS
Refills: 0 | Status: DISCONTINUED | OUTPATIENT
Start: 2021-01-04 | End: 2021-01-06

## 2021-01-04 RX ORDER — ATORVASTATIN CALCIUM 80 MG/1
20 TABLET, FILM COATED ORAL AT BEDTIME
Refills: 0 | Status: DISCONTINUED | OUTPATIENT
Start: 2021-01-04 | End: 2021-01-06

## 2021-01-04 RX ORDER — CEFTRIAXONE 500 MG/1
1000 INJECTION, POWDER, FOR SOLUTION INTRAMUSCULAR; INTRAVENOUS EVERY 24 HOURS
Refills: 0 | Status: DISCONTINUED | OUTPATIENT
Start: 2021-01-04 | End: 2021-01-04

## 2021-01-04 RX ORDER — TAMSULOSIN HYDROCHLORIDE 0.4 MG/1
0.4 CAPSULE ORAL AT BEDTIME
Refills: 0 | Status: DISCONTINUED | OUTPATIENT
Start: 2021-01-04 | End: 2021-01-06

## 2021-01-04 RX ORDER — INFLUENZA VIRUS VACCINE 15; 15; 15; 15 UG/.5ML; UG/.5ML; UG/.5ML; UG/.5ML
0.5 SUSPENSION INTRAMUSCULAR ONCE
Refills: 0 | Status: COMPLETED | OUTPATIENT
Start: 2021-01-04 | End: 2021-01-04

## 2021-01-04 RX ORDER — ONDANSETRON 8 MG/1
4 TABLET, FILM COATED ORAL EVERY 8 HOURS
Refills: 0 | Status: DISCONTINUED | OUTPATIENT
Start: 2021-01-04 | End: 2021-01-06

## 2021-01-04 RX ORDER — METRONIDAZOLE 500 MG
500 TABLET ORAL EVERY 8 HOURS
Refills: 0 | Status: DISCONTINUED | OUTPATIENT
Start: 2021-01-04 | End: 2021-01-06

## 2021-01-04 RX ORDER — LISINOPRIL 2.5 MG/1
5 TABLET ORAL DAILY
Refills: 0 | Status: DISCONTINUED | OUTPATIENT
Start: 2021-01-04 | End: 2021-01-06

## 2021-01-04 RX ORDER — LEVOTHYROXINE SODIUM 125 MCG
150 TABLET ORAL DAILY
Refills: 0 | Status: DISCONTINUED | OUTPATIENT
Start: 2021-01-04 | End: 2021-01-06

## 2021-01-04 RX ORDER — SODIUM CHLORIDE 9 MG/ML
1000 INJECTION INTRAMUSCULAR; INTRAVENOUS; SUBCUTANEOUS
Refills: 0 | Status: DISCONTINUED | OUTPATIENT
Start: 2021-01-04 | End: 2021-01-06

## 2021-01-04 RX ORDER — PANTOPRAZOLE SODIUM 20 MG/1
40 TABLET, DELAYED RELEASE ORAL DAILY
Refills: 0 | Status: DISCONTINUED | OUTPATIENT
Start: 2021-01-04 | End: 2021-01-06

## 2021-01-04 RX ORDER — KETOROLAC TROMETHAMINE 30 MG/ML
30 SYRINGE (ML) INJECTION ONCE
Refills: 0 | Status: DISCONTINUED | OUTPATIENT
Start: 2021-01-04 | End: 2021-01-04

## 2021-01-04 RX ORDER — MORPHINE SULFATE 50 MG/1
4 CAPSULE, EXTENDED RELEASE ORAL EVERY 4 HOURS
Refills: 0 | Status: DISCONTINUED | OUTPATIENT
Start: 2021-01-04 | End: 2021-01-06

## 2021-01-04 RX ORDER — HEPARIN SODIUM 5000 [USP'U]/ML
5000 INJECTION INTRAVENOUS; SUBCUTANEOUS EVERY 8 HOURS
Refills: 0 | Status: DISCONTINUED | OUTPATIENT
Start: 2021-01-04 | End: 2021-01-05

## 2021-01-04 RX ADMIN — Medication 200 MILLIGRAM(S): at 06:17

## 2021-01-04 RX ADMIN — SODIUM CHLORIDE 100 MILLILITER(S): 9 INJECTION INTRAMUSCULAR; INTRAVENOUS; SUBCUTANEOUS at 02:13

## 2021-01-04 RX ADMIN — ATORVASTATIN CALCIUM 20 MILLIGRAM(S): 80 TABLET, FILM COATED ORAL at 21:27

## 2021-01-04 RX ADMIN — Medication 100 MILLIGRAM(S): at 21:27

## 2021-01-04 RX ADMIN — LISINOPRIL 5 MILLIGRAM(S): 2.5 TABLET ORAL at 06:20

## 2021-01-04 RX ADMIN — Medication 150 MICROGRAM(S): at 06:20

## 2021-01-04 RX ADMIN — Medication 100 MILLIGRAM(S): at 06:18

## 2021-01-04 RX ADMIN — Medication 30 MILLIGRAM(S): at 06:17

## 2021-01-04 RX ADMIN — Medication 200 MILLIGRAM(S): at 17:55

## 2021-01-04 RX ADMIN — SODIUM CHLORIDE 100 MILLILITER(S): 9 INJECTION INTRAMUSCULAR; INTRAVENOUS; SUBCUTANEOUS at 09:21

## 2021-01-04 RX ADMIN — Medication 100 MILLIGRAM(S): at 12:30

## 2021-01-04 RX ADMIN — PANTOPRAZOLE SODIUM 40 MILLIGRAM(S): 20 TABLET, DELAYED RELEASE ORAL at 12:30

## 2021-01-04 RX ADMIN — TAMSULOSIN HYDROCHLORIDE 0.4 MILLIGRAM(S): 0.4 CAPSULE ORAL at 21:27

## 2021-01-04 RX ADMIN — Medication 30 MILLIGRAM(S): at 02:13

## 2021-01-04 NOTE — H&P ADULT - NSHPLABSRESULTS_GEN_ALL_CORE
12.7   6.42  )-----------( 177      ( 03 Jan 2021 17:30 )             38.3       01-03    139  |  98  |  21<H>  ----------------------------<  212<H>  3.9   |  28  |  1.2    Ca    9.8      03 Jan 2021 17:30    TPro  7.8  /  Alb  4.6  /  TBili  0.9  /  DBili  <0.2  /  AST  29  /  ALT  35  /  AlkPhos  74  01-03              Urinalysis Basic - ( 03 Jan 2021 17:30 )    Color: Yellow / Appearance: Clear / SG: >=1.030 / pH: x  Gluc: x / Ketone: Negative  / Bili: Negative / Urobili: 0.2 mg/dL   Blood: x / Protein: 100 mg/dL / Nitrite: Negative   Leuk Esterase: Negative / RBC: 11-25 /HPF / WBC 3-5 /HPF   Sq Epi: x / Non Sq Epi: Few /HPF / Bacteria: TNTC /HPF            Lactate Trend  01-03 @ 17:30 Lactate:2.5       CARDIAC MARKERS ( 03 Jan 2021 17:30 )  x     / <0.01 ng/mL / x     / x     / x          RUQ sono today showed gb sludge, without the presence of pericholecystic fluid or gb wall thickening.    CT A/P today was pertinent for mod right sided hydronephrosis, secondary to a 6x5 mm obstructing right UVJ stone. +delayed right sided nephrogram and p[erinephric stranding. Abdominopelvic organs and left kidney unremarkable on CT.    Abd

## 2021-01-04 NOTE — H&P ADULT - HISTORY OF PRESENT ILLNESS
56 y/o female with a PMH of hypothyroidism, thyroid ca- s/p partial thyroidectomy , DM, HTN, and hypercholesterolemia presented to ER with right sided abdominal/flank pain since 1500 today. Pt reports associated nausea. She denies vomiting, fever/chills, dysuria, urinary frequency, or radiation of pain.   She admits to eating ribs 3 hrs prior to onset of pain.  Pt states she was recently diagnosed with a right sided renal calculus on CT Scan 3 weeks ago, following onset of gross hematuria. She reports only lower back pain at that time, which appeared to be musculoskeletal in nature. Pt was referred to urology (??Dr Forman??), renal/bladder sonogram was pertinent for right sided renal stone and she was scheduled for KUB tomorrow. Pt reports occasional episodes of painless gross hematuria since onset.     RUQ sono today showed gb sludge, without the presence of pericholecystic fluid or gb wall thickening.  CT A/P today was pertinent for mod right sided hydronephrosis, secondary to a 6x5 mm obstructing right UVJ stone. +delayed right sided nephrogram and p[erinephric stranding. Abdominopelvic organs and left kidney unremarkable on CT.

## 2021-01-04 NOTE — H&P ADULT - NSICDXPASTMEDICALHX_GEN_ALL_CORE_FT
PAST MEDICAL HISTORY:  DM (diabetes mellitus)     High cholesterol     HTN (hypertension)     Hypothyroid     Thyroid cancer

## 2021-01-04 NOTE — H&P ADULT - ASSESSMENT
Pt is a 54 y/o female with Biliary Colic R/O Cholecystitis and Obstructing Right sided Ureteral Stone with moderate hydronephrosis    Surgery Consult appreciated  NPO/IVF  IV abx  Monitor labs/LFTs  Zofran PRN  Pain Management  IV PPI  Flomax  Strain Urine  Obtain accurate information regarding pt's urologist in am. Since pt is tender on exam and symptoms appear to be secondary to gallbladder, will discuss with pt Urologist regarding their further recommendations.    HTN/Hypercholesetrolemia  Continue Lisinopril and Lipitor    DM   Monitor FS q6h  Hold oral hypoglycemics while pt NPO    DVT Prophylaxis Pt is a 54 y/o female with Biliary Colic R/O Cholecystitis and Obstructing Right sided Ureteral Stone with moderate hydronephrosis    Surgery Consult appreciated  NPO/IVF  IV abx  Monitor labs/LFTs  Zofran PRN  Pain Management  IV PPI  Flomax  Follow up Urine Cx  Strain Urine for calculi  Obtain accurate information regarding pt's urologist in am. Since pt is tender on exam and symptoms appear to be secondary to gallbladder, will discuss with pt Urologist regarding their further recommendations.    HTN/Hypercholesterolemia  Continue Lisinopril and Lipitor    DM   Monitor FS q6h  Hold oral hypoglycemics while pt NPO    DVT Prophylaxis

## 2021-01-04 NOTE — H&P ADULT - ATTENDING COMMENTS
# Biliary Colic  - RUQ sono: GB sludge, without the presence of pericholecystic fluid or gb wall thickening.  - NPO for now   - IVF  - f/u Sx recommendations    # Right  ureteral stone  - hemodynamically stable  - CT abdomen: Obstructing 0.6 cm right ureteral stone just proximal to the uretero-vesicular junction with resulting moderate right-sided hydroureteronephrosis.  - pain control   - UA positive  - c/w abx  - IVF @100  - start Flomax    # HTN  - c/w Lisinopril     # DM  - monitor FS  - start sliding scale insulin when FS>180    # DLD  - c/w atorvastatin     # Hypothyroid   # Hx of Thyroid Ca s/p partial thyroidectomy  - c/w levothyroxine    # GI ppx  - start PO Protonix     # DVT ppx   - start Lovenox 40mg     # Ambulate as tolerated    # DASH diet, CHO consistent    # Full code

## 2021-01-04 NOTE — H&P ADULT - NSHPPHYSICALEXAM_GEN_ALL_CORE
Gen NAD, A&Ox3  CV +S1 and S2, RR  Resp +air entry B/L  Abd soft, +right sided abdominal tenderness (>RUQ), no rebound, not distended  Back No CVA tenderness B/L  Ext no edema, No CT      Vital Signs Last 24 Hrs  T(C): 36.8 (03 Jan 2021 19:21), Max: 36.8 (03 Jan 2021 16:50)  T(F): 98.3 (03 Jan 2021 19:21), Max: 98.3 (03 Jan 2021 16:50)  HR: 80 (03 Jan 2021 19:21) (77 - 91)  BP: 152/78 (03 Jan 2021 19:21) (152/78 - 190/90)  BP(mean): --  RR: 18 (03 Jan 2021 19:21) (16 - 18)  SpO2: 98% (03 Jan 2021 19:21) (95% - 99%) Gen NAD, A&Ox3  CV +S1 and S2, RR  Resp +air entry B/L  Abd soft, +right sided abdominal tenderness (>RUQ), no rebound, not distended  Back No CVA tenderness B/L  Ext no edema, No CT    Vital Signs Last 24 Hrs  T(C): 36.8 (03 Jan 2021 19:21), Max: 36.8 (03 Jan 2021 16:50)  T(F): 98.3 (03 Jan 2021 19:21), Max: 98.3 (03 Jan 2021 16:50)  HR: 80 (03 Jan 2021 19:21) (77 - 91)  BP: 152/78 (03 Jan 2021 19:21) (152/78 - 190/90)  BP(mean): --  RR: 18 (03 Jan 2021 19:21) (16 - 18)  SpO2: 98% (03 Jan 2021 19:21) (95% - 99%)

## 2021-01-04 NOTE — CONSULT NOTE ADULT - SUBJECTIVE AND OBJECTIVE BOX
· HPI Objective Statement: 54 y/o female with a PMH of hypothyroidism, thyroid ca- s/p partial thyroidectomy , DM, HTN, and hypercholesterolemia, and right kidney stone presents to the ED for evaluation of sharp constant ruq pain that began an hour prior to arrival while sitting down. pt reports hx of appendectomy. pt never had endoscopy. pt last colonoscopy about a year ago with dr. macdonald. pt reports nausea, and sweating. pt denies fever, chills, vomiting, diarrhea, urinary symptoms, back pain, chest pain, sob, weakness, or rashes.    HIV:    HIV Test Questions:  · In accordance with [place] State law, we offer every patient who comes to our ED an HIV test. Would you like to be tested today?	Opt out    PAST MEDICAL/SURGICAL/FAMILY/SOCIAL HISTORY:    Past Medical History:  DM (diabetes mellitus)    High cholesterol    HTN (hypertension)    Hypothyroid    Thyroid cancer.     Past Surgical History:  H/O partial thyroidectomy.  appendectomy     Tobacco Usage:  · Tobacco Usage	Never smoker     ALLERGIES AND HOME MEDICATIONS:   Allergies:        Allergies:  	No Known Allergies:     Home Medications:   * Incomplete Medication History as of 26-Oct-2018 03:14 documented in Structured Notes  · 	tiZANidine 2 mg oral capsule: 1 cap(s) orally every 8 hours, As Needed for pain  · 	metFORMIN:   · 	lisinopril:   · 	atorvastatin:   · 	glimepiride:   · 	levothyroxine:     REVIEW OF SYSTEMS:    Review of Systems:  · Review of Systems: CONST: No fever, chills or bodyaches  	EYES: No pain, redness, drainage or visual changes.  	ENT: No ear pain or discharge, nasal discharge or congestion. No sore throat  	CARD: No chest pain, palpitations  	RESP: No SOB, cough, hemoptysis. No hx of asthma or COPD  	GI: (+) ruq abdominal pain, and nausea. No V/D  	: No urinary symptoms  	MS: No joint pain, back pain or extremity pain/injury  	SKIN: No rashes  NEURO: No headache, dizziness, paresthesias or LOC    PHYSICAL EXAM:   · Physical Examination: Physical Exam    	Vital Signs: I have reviewed the initial vital signs.  	Constitutional: well-nourished, appears stated age, no acute distress  	Eyes: Conjunctiva pink, Sclera clear  	Cardiovascular: S1 and S2, regular rate, regular rhythm, well-perfused extremities, radial pulses equal and 2+ b/l.   	Respiratory: unlabored respiratory effort, clear to auscultation bilaterally no wheezing, rales and rhonchi. pt is speaking full sentences. no accessory muscle use.   	Gastrointestinal: soft, (+) ruq tenderness, nondistended abdomen, no pulsatile mass, normal bowl sounds, no rebound, no guarding, negative psoas, negative obturator, (+) murphys. no organomegaly. no cva tenderness.   	Musculoskeletal: supple neck, no lower extremity edema, no calf tenderness  	Integumentary: warm, dry, no rash  	Neurologic: awake, alert  Psychiatric: appropriate mood, appropriate affect                        12.7   6.42  )-----------( 177      ( 03 Jan 2021 17:30 )             38.3   01-03    139  |  98  |  21<H>  ----------------------------<  212<H>  3.9   |  28  |  1.2    Ca    9.8      03 Jan 2021 17:30    TPro  7.8  /  Alb  4.6  /  TBili  0.9  /  DBili  <0.2  /  AST  29  /  ALT  35  /  AlkPhos  74  01-03  EXAM:  US ABDOMEN LIMITED            PROCEDURE DATE:  01/03/2021            INTERPRETATION:  CLINICAL INFORMATION: Right upper quadrant pain.    COMPARISON: None available.    TECHNIQUE: Sonography of the right upper quadrant.    FINDINGS:    Liver: Increased echogenicity.  Bile ducts: Normal caliber. Common bile duct measures 5 (mm).  Gallbladder: Gallbladder sludge. No gallbladder wall thickness or pericholecystic fluid. Negative sonographic Kaur's sign.  Pancreas: Visualized portions are within normal limits.  Right kidney: 12.3 cm. minimal dilatation of the collecting system. Right renal upper pole calculi measuring 0.3 cm.  Ascites: None.  IVC: Visualized portions are within normal limits.    IMPRESSION:    Gallbladder sludge with no sonographic evidence of acute cholecystitis.    Hepatic steatosis.    Right renal upper pole stone measuring 0.3 cm. Minimal dilatation of the collecting system.              ALYSSA NASH M.D., RESIDENT RADIOLOGIST  This document has been electronically signed.  JOHANA HERNANDEZ MD; Attending Radiologist  This document has been electronically signed. Jesús  3 2021  7:44PM      Assessment and Recommendation:   · Assessment	  BILIARY COLIC · HPI Objective Statement: 56 y/o female with a PMH of hypothyroidism, thyroid ca- s/p partial thyroidectomy , DM, HTN, and hypercholesterolemia, and right kidney stone presents to the ED for evaluation of sharp constant ruq pain that began an hour prior to arrival while sitting down. pt reports hx of appendectomy. pt never had endoscopy. pt last colonoscopy about a year ago with dr. macdonald. pt reports nausea, and sweating. pt denies fever, chills, vomiting, diarrhea, urinary symptoms, back pain, chest pain, sob, weakness, or rashes.    HIV:    HIV Test Questions:  · In accordance with [place] State law, we offer every patient who comes to our ED an HIV test. Would you like to be tested today?	Opt out    PAST MEDICAL/SURGICAL/FAMILY/SOCIAL HISTORY:    Past Medical History:  DM (diabetes mellitus)    High cholesterol    HTN (hypertension)    Hypothyroid    Thyroid cancer.     Past Surgical History:  H/O partial thyroidectomy.  appendectomy     Tobacco Usage:  · Tobacco Usage	Never smoker     ALLERGIES AND HOME MEDICATIONS:   Allergies:        Allergies:  	No Known Allergies:     Home Medications:   * Incomplete Medication History as of 26-Oct-2018 03:14 documented in Structured Notes  · 	tiZANidine 2 mg oral capsule: 1 cap(s) orally every 8 hours, As Needed for pain  · 	metFORMIN:   · 	lisinopril:   · 	atorvastatin:   · 	glimepiride:   · 	levothyroxine:     REVIEW OF SYSTEMS:    Review of Systems:  · Review of Systems: CONST: No fever, chills or bodyaches  	EYES: No pain, redness, drainage or visual changes.  	ENT: No ear pain or discharge, nasal discharge or congestion. No sore throat  	CARD: No chest pain, palpitations  	RESP: No SOB, cough, hemoptysis. No hx of asthma or COPD  	GI: (+) ruq abdominal pain, and nausea. No V/D  	: No urinary symptoms  	MS: No joint pain, back pain or extremity pain/injury  	SKIN: No rashes  NEURO: No headache, dizziness, paresthesias or LOC    PHYSICAL EXAM:   · Physical Examination: Physical Exam    	Vital Signs: I have reviewed the initial vital signs.  	Constitutional: well-nourished, appears stated age, no acute distress  	Eyes: Conjunctiva pink, Sclera clear  	Cardiovascular: S1 and S2, regular rate, regular rhythm, well-perfused extremities, radial pulses equal and 2+ b/l.   	Respiratory: unlabored respiratory effort, clear to auscultation bilaterally no wheezing, rales and rhonchi. pt is speaking full sentences. no accessory muscle use.   	Gastrointestinal: soft, (+) ruq tenderness, nondistended abdomen, no pulsatile mass, normal bowl sounds, no rebound, no guarding, negative psoas, negative obturator, (+) murphys. no organomegaly. no cva tenderness.   	Musculoskeletal: supple neck, no lower extremity edema, no calf tenderness  	Integumentary: warm, dry, no rash  	Neurologic: awake, alert  Psychiatric: appropriate mood, appropriate affect                        12.7   6.42  )-----------( 177      ( 03 Jan 2021 17:30 )             38.3   01-03    139  |  98  |  21<H>  ----------------------------<  212<H>  3.9   |  28  |  1.2    Ca    9.8      03 Jan 2021 17:30    EXAM:  US ABDOMEN LIMITED            PROCEDURE DATE:  01/03/2021            INTERPRETATION:  CLINICAL INFORMATION: Right upper quadrant pain.    COMPARISON: None available.    TECHNIQUE: Sonography of the right upper quadrant.    FINDINGS:    Liver: Increased echogenicity.  Bile ducts: Normal caliber. Common bile duct measures 5 (mm).  Gallbladder: Gallbladder sludge. No gallbladder wall thickness or pericholecystic fluid. Negative sonographic Kaur's sign.  Pancreas: Visualized portions are within normal limits.  Right kidney: 12.3 cm. minimal dilatation of the collecting system. Right renal upper pole calculi measuring 0.3 cm.  Ascites: None.  IVC: Visualized portions are within normal limits.    IMPRESSION:    Gallbladder sludge with nosonographic evidence of acute cholecystitis.    Hepatic steatosis.    Right renal upper pole stone measuring 0.3 cm. Minimal dilatation of the collecting system.              ALYSSA NASH M.D., RESIDENT RADIOLOGIST  This document has been electronically signed.  JOHANA HERNANDEZ MD; Attending Radiologist  This document has been electronically signed. Jesús  3 2021  7:44PM       · HPI Objective Statement: 56 y/o female with a PMH of hypothyroidism, thyroid ca- s/p partial thyroidectomy , DM, HTN, and hypercholesterolemia, and right kidney stone presents to the ED for evaluation of sharp constant ruq pain that began an hour prior to arrival while sitting down. pt reports hx of appendectomy. pt never had endoscopy. pt last colonoscopy about a year ago with dr. macdonald. pt reports nausea, and sweating. pt denies fever, chills, vomiting, diarrhea, urinary symptoms, back pain, chest pain, sob, weakness, or rashes.    HIV:    HIV Test Questions:  · In accordance with [place] law, we offer every patient who comes to our ED an HIV test. Would you like to be tested today?	Opt out    PAST MEDICAL/SURGICAL/FAMILY/SOCIAL HISTORY:    Past Medical History:  DM (diabetes mellitus)    High cholesterol    HTN (hypertension)    Hypothyroid    Thyroid cancer.     Past Surgical History:  H/O partial thyroidectomy.  appendectomy     Tobacco Usage:  · Tobacco Usage	Never smoker     ALLERGIES AND HOME MEDICATIONS:   Allergies:        Allergies:  	No Known Allergies:     Home Medications:   * Incomplete Medication History as of 26-Oct-2018 03:14 documented in Structured Notes  · 	tiZANidine 2 mg oral capsule: 1 cap(s) orally every 8 hours, As Needed for pain  · 	metFORMIN:   · 	lisinopril:   · 	atorvastatin:   · 	glimepiride:   · 	levothyroxine:     REVIEW OF SYSTEMS:    Review of Systems:  · Review of Systems: CONST: No fever, chills or bodyaches  	EYES: No pain, redness, drainage or visual changes.  	ENT: No ear pain or discharge, nasal discharge or congestion. No sore throat  	CARD: No chest pain, palpitations  	RESP: No SOB, cough, hemoptysis. No hx of asthma or COPD  	GI: (+) ruq abdominal pain, and nausea. No V/D  	: No urinary symptoms  	MS: No joint pain, back pain or extremity pain/injury  	SKIN: No rashes  NEURO: No headache, dizziness, paresthesias or LOC    PHYSICAL EXAM:   · Physical Examination: Physical Exam    	Vital Signs: I have reviewed the initial vital signs.  	Constitutional: well-nourished, appears stated age, no acute distress  	Eyes: Conjunctiva pink, Sclera clear  	Cardiovascular: S1 and S2, regular rate, regular rhythm, well-perfused extremities, radial pulses equal and 2+ b/l.   	Respiratory: unlabored respiratory effort, clear to auscultation bilaterally no wheezing, rales and rhonchi. pt is speaking full sentences. no accessory muscle use.   	Gastrointestinal: soft, (+) ruq tenderness, nondistended abdomen, no pulsatile mass, normal bowl sounds, no rebound, no guarding, negative psoas, negative obturator, (+) murphys. no organomegaly. no cva tenderness.   	Musculoskeletal: supple neck, no lower extremity edema, no calf tenderness  	Integumentary: warm, dry, no rash  	Neurologic: awake, alert  Psychiatric: appropriate mood, appropriate affect                        12.7   6.42  )-----------( 177      ( 03 Jan 2021 17:30 )             38.3   01-03    139  |  98  |  21<H>  ----------------------------<  212<H>  3.9   |  28  |  1.2    Ca    9.8      03 Jan 2021 17:30    EXAM:  US ABDOMEN LIMITED            PROCEDURE DATE:  01/03/2021            INTERPRETATION:  CLINICAL INFORMATION: Right upper quadrant pain.    COMPARISON: None available.    TECHNIQUE: Sonography of the right upper quadrant.    FINDINGS:    Liver: Increased echogenicity.  Bile ducts: Normal caliber. Common bile duct measures 5 (mm).  Gallbladder: Gallbladder sludge. No gallbladder wall thickness or pericholecystic fluid. Negative sonographic Kaur's sign.  Pancreas: Visualized portions are within normal limits.  Right kidney: 12.3 cm. minimal dilatation of the collecting system. Right renal upper pole calculi measuring 0.3 cm.  Ascites: None.  IVC: Visualized portions are within normal limits.    IMPRESSION:    Gallbladder sludge with nosonographic evidence of acute cholecystitis.    Hepatic steatosis.    Right renal upper pole stone measuring 0.3 cm. Minimal dilatation of the collecting system.              ALYSSA NASH M.D., RESIDENT RADIOLOGIST  This document has been electronically signed.  JOHANA HERNANDEZ MD; Attending Radiologist  This document has been electronically signed. Jesús  3 2021  7:44PM         · HPI Objective Statement: 54 y/o female with a PMH of hypothyroidism, thyroid ca- s/p partial thyroidectomy , DM, HTN, and hypercholesterolemia, and right kidney stone presents to the ED for evaluation of sharp constant ruq pain that began an hour prior to arrival while sitting down. pt reports hx of appendectomy. pt never had endoscopy. pt last colonoscopy about a year ago with dr. macdonald. pt reports nausea, and sweating. pt denies fever, chills, vomiting, diarrhea, urinary symptoms, back pain, chest pain, sob, weakness, or rashes.    HIV:    HIV Test Questions:  · In accordance with [place] law, we offer every patient who comes to our ED an HIV test. Would you like to be tested today?	Opt out    PAST MEDICAL/SURGICAL/FAMILY/SOCIAL HISTORY:    Past Medical History:  DM (diabetes mellitus)    High cholesterol    HTN (hypertension)    Hypothyroid    Thyroid cancer.     Past Surgical History:  H/O partial thyroidectomy.  appendectomy     Tobacco Usage:  · Tobacco Usage	Never smoker     ALLERGIES AND HOME MEDICATIONS:   Allergies:        Allergies:  	No Known Allergies:     Home Medications:   * Incomplete Medication History as of 26-Oct-2018 03:14 documented in Structured Notes  · 	tiZANidine 2 mg oral capsule: 1 cap(s) orally every 8 hours, As Needed for pain  · 	metFORMIN:   · 	lisinopril:   · 	atorvastatin:   · 	glimepiride:   · 	levothyroxine:     REVIEW OF SYSTEMS:    Review of Systems:  · Review of Systems: CONST: No fever, chills or bodyaches  	EYES: No pain, redness, drainage or visual changes.  	ENT: No ear pain or discharge, nasal discharge or congestion. No sore throat  	CARD: No chest pain, palpitations  	RESP: No SOB, cough, hemoptysis. No hx of asthma or COPD  	GI: (+) ruq abdominal pain, and nausea. No V/D  	: No urinary symptoms  	MS: No joint pain, back pain or extremity pain/injury  	SKIN: No rashes  NEURO: No headache, dizziness, paresthesias or LOC    PHYSICAL EXAM:   · Physical Examination: Physical Exam    	Vital Signs: I have reviewed the initial vital signs.  	Constitutional: well-nourished, appears stated age, no acute distress  	Eyes: Conjunctiva pink, Sclera clear  	Cardiovascular: S1 and S2, regular rate, regular rhythm, well-perfused extremities, radial pulses equal and 2+ b/l.   	Respiratory: unlabored respiratory effort, clear to auscultation bilaterally no wheezing, rales and rhonchi. pt is speaking full sentences. no accessory muscle use.   	Gastrointestinal: soft, (+) ruq tenderness, nondistended abdomen, no pulsatile mass, normal bowl sounds, no rebound, no guarding, negative psoas, negative obturator, (+) murphys. no organomegaly. no cva tenderness.   	Musculoskeletal: supple neck, no lower extremity edema, no calf tenderness  	Integumentary: warm, dry, no rash  	Neurologic: awake, alert  Psychiatric: appropriate mood, appropriate affect                        12.7   6.42  )-----------( 177      ( 03 Jan 2021 17:30 )             38.3   01-03    139  |  98  |  21<H>  ----------------------------<  212<H>  3.9   |  28  |  1.2    Ca    9.8      03 Jan 2021 17:30    EXAM:  US ABDOMEN LIMITED            PROCEDURE DATE:  01/03/2021            INTERPRETATION:  CLINICAL INFORMATION: Right upper quadrant pain.    COMPARISON: None available.    TECHNIQUE: Sonography of the right upper quadrant.    FINDINGS:    Liver: Increased echogenicity.  Bile ducts: Normal caliber. Common bile duct measures 5 (mm).  Gallbladder: Gallbladder sludge. No gallbladder wall thickness or pericholecystic fluid. Negative sonographic Kaur's sign.  Pancreas: Visualized portions are within normal limits.  Right kidney: 12.3 cm. minimal dilatation of the collecting system. Right renal upper pole calculi measuring 0.3 cm.  Ascites: None.  IVC: Visualized portions are within normal limits.    IMPRESSION:    Gallbladder sludge with nosonographic evidence of acute cholecystitis.    Hepatic steatosis.    Right renal upper pole stone measuring 0.3 cm. Minimal dilatation of the collecting system.              ALYSSA NASH M.D., RESIDENT RADIOLOGIST  This document has been electronically signed.  JOHANA HERNANDEZ MD; Attending Radiologist  This document has been electronically signed. Jesús  3 2021  7:44PM      CT A/P w/IV    IMPRESSION:    Obstructing 0.6 cm right ureteral stone just proximal to the ureterovesicular junction with resulting moderate right-sided hydroureteronephrosis.    Left lower lobe subpleural pulmonary nodules measuring up to 0.6 cm. Recommend 6 month CT chest follow-up.

## 2021-01-04 NOTE — H&P ADULT - NSHPSOCIALHISTORY_GEN_ALL_CORE
Marital Status:  (  x )    (   ) Single    (   )    (  )   Lives with: (  ) alone  (  ) children   ( x ) spouse   (  ) parents  (  ) other  Recent Travel: No recent travel    Substance Use (street drugs): ( x ) never used  (  ) other:  Tobacco Usage:  ( x  ) never smoked   (   ) former smoker   (   ) current smoker  (     ) pack year  Alcohol Usage: None

## 2021-01-05 LAB
GLUCOSE BLDC GLUCOMTR-MCNC: 164 MG/DL — HIGH (ref 70–99)
GLUCOSE BLDC GLUCOMTR-MCNC: 173 MG/DL — HIGH (ref 70–99)
GLUCOSE BLDC GLUCOMTR-MCNC: 179 MG/DL — HIGH (ref 70–99)
GLUCOSE BLDC GLUCOMTR-MCNC: 183 MG/DL — HIGH (ref 70–99)
GLUCOSE BLDC GLUCOMTR-MCNC: 185 MG/DL — HIGH (ref 70–99)
SARS-COV-2 IGG SERPL QL IA: NEGATIVE — SIGNIFICANT CHANGE UP
SARS-COV-2 IGM SERPL IA-ACNC: <0.1 INDEX — SIGNIFICANT CHANGE UP

## 2021-01-05 RX ORDER — KETOROLAC TROMETHAMINE 30 MG/ML
30 SYRINGE (ML) INJECTION ONCE
Refills: 0 | Status: DISCONTINUED | OUTPATIENT
Start: 2021-01-05 | End: 2021-01-05

## 2021-01-05 RX ORDER — HEPARIN SODIUM 5000 [USP'U]/ML
5000 INJECTION INTRAVENOUS; SUBCUTANEOUS EVERY 12 HOURS
Refills: 0 | Status: DISCONTINUED | OUTPATIENT
Start: 2021-01-05 | End: 2021-01-06

## 2021-01-05 RX ORDER — KETOROLAC TROMETHAMINE 30 MG/ML
15 SYRINGE (ML) INJECTION EVERY 6 HOURS
Refills: 0 | Status: DISCONTINUED | OUTPATIENT
Start: 2021-01-05 | End: 2021-01-06

## 2021-01-05 RX ADMIN — SODIUM CHLORIDE 100 MILLILITER(S): 9 INJECTION INTRAMUSCULAR; INTRAVENOUS; SUBCUTANEOUS at 06:23

## 2021-01-05 RX ADMIN — Medication 200 MILLIGRAM(S): at 17:00

## 2021-01-05 RX ADMIN — Medication 30 MILLIGRAM(S): at 01:34

## 2021-01-05 RX ADMIN — Medication 100 MILLIGRAM(S): at 14:30

## 2021-01-05 RX ADMIN — SODIUM CHLORIDE 100 MILLILITER(S): 9 INJECTION INTRAMUSCULAR; INTRAVENOUS; SUBCUTANEOUS at 23:15

## 2021-01-05 RX ADMIN — TAMSULOSIN HYDROCHLORIDE 0.4 MILLIGRAM(S): 0.4 CAPSULE ORAL at 21:15

## 2021-01-05 RX ADMIN — Medication 15 MILLIGRAM(S): at 22:29

## 2021-01-05 RX ADMIN — Medication 30 MILLIGRAM(S): at 12:33

## 2021-01-05 RX ADMIN — Medication 15 MILLIGRAM(S): at 23:16

## 2021-01-05 RX ADMIN — Medication 100 MILLIGRAM(S): at 06:19

## 2021-01-05 RX ADMIN — Medication 30 MILLIGRAM(S): at 12:08

## 2021-01-05 RX ADMIN — Medication 150 MICROGRAM(S): at 06:19

## 2021-01-05 RX ADMIN — PANTOPRAZOLE SODIUM 40 MILLIGRAM(S): 20 TABLET, DELAYED RELEASE ORAL at 11:30

## 2021-01-05 RX ADMIN — Medication 100 MILLIGRAM(S): at 21:14

## 2021-01-05 RX ADMIN — ATORVASTATIN CALCIUM 20 MILLIGRAM(S): 80 TABLET, FILM COATED ORAL at 21:15

## 2021-01-05 RX ADMIN — LISINOPRIL 5 MILLIGRAM(S): 2.5 TABLET ORAL at 06:19

## 2021-01-05 RX ADMIN — Medication 200 MILLIGRAM(S): at 06:20

## 2021-01-05 RX ADMIN — Medication 30 MILLIGRAM(S): at 00:16

## 2021-01-05 NOTE — PROGRESS NOTE ADULT - NUTRITIONAL ASSESSMENT
RUQ/Biliary colic  - HIDA negative but still with RUQ postprandially  - will d/w surgery attending RUQ pain/Biliary colic  - HIDA negative but still with RUQ postprandially  - change diet CLD today, NPO after PM  - For OR tomorrow for lap marv, attending to book  - check COVID PCR (done and brought to lab)  - AM Labs  - Medical Risk Stratification prior to OR please  - d/w attending

## 2021-01-05 NOTE — PROGRESS NOTE ADULT - ATTENDING COMMENTS
above noted discussed case with surgical resident abdomen soft tender RUQ pain after eating hida scan noted

## 2021-01-05 NOTE — PROGRESS NOTE ADULT - ASSESSMENT
Patient is a 55y old  Female who presents with a chief complaint of RUQ pain, being admitted for Biliary Colic.    # Biliary Colic  - RUQ sono: GB sludge, without the presence of pericholecystic fluid or gb wall thickening.  - continue NPO for now   - IVF  - Appreciate  Surgery recommendations  - HIDA negative  - Patient is still in severe pain, therefore, surgery is considering lap marv but needs to discuss with Dr. Fong     # Right  ureteral stone  - CT abdomen: Obstructing 0.6 cm right ureteral stone just proximal to the uretero-vesicular junction with resulting moderate right-sided hydroureteronephrosis.  - pain control   - UA positive  - Continue with Cipro  - Continue  Intravenous Fluids   - Started on  Flomax in house, Continue   - Stone is known to outpatient urologist Dr. Figueroa, patient is asymptomatic and needs to follow up outpatient     # HTN  - continue Lisinopril     # DM  - POC glucose  - start sliding scale insulin when FS>180    # DLD  - c/w atorvastatin     # Hypothyroid , Hx of Thyroid Ca s/p partial thyroidectomy  - c/w levothyroxine    # GI ppx  - continue PO Protonix     # DVT ppx   -  Lovenox SQ            Progress Note Handoff  Pending Consults:  Pending Tests:  Pending Results:  Family Discussion:  Disposition: Home_____/SNF______/Other_____/Unknown at this time_____ Patient is a 55y old  Female who presents with a chief complaint of RUQ pain, being admitted for Biliary Colic.    # Biliary Colic  - RUQ sono: GB sludge, without the presence of pericholecystic fluid or gb wall thickening.  - Clear liquid diet, NPO p Midnight   - IVF  - Appreciate  Surgery recommendations  - HIDA negative  - Surgery scheduled for tomorrow 1/6 with Hetal   - NPO p midnight   - Follow up am labs   - COVID today     # Right  ureteral stone  - CT abdomen: Obstructing 0.6 cm right ureteral stone just proximal to the uretero-vesicular junction with resulting moderate right-sided hydroureteronephrosis.  - pain control   - UA positive  - Continue with Cipro  - Continue  Intravenous Fluids   - Started on  Flomax in house, Continue   - Stone is known to outpatient urologist Dr. Figueroa, patient is asymptomatic and needs to follow up outpatient     # HTN  - continue Lisinopril     # DM  - POC glucose  - start sliding scale insulin when FS>180    # DLD  - c/w atorvastatin     # Hypothyroid , Hx of Thyroid Ca s/p partial thyroidectomy  - c/w levothyroxine    # GI ppx  - continue PO Protonix     # DVT ppx   -  Lovenox SQ      MEDICAL RISK STRATIFICATION FROM MEDICAL ATTENDING: _____

## 2021-01-06 ENCOUNTER — RESULT REVIEW (OUTPATIENT)
Age: 56
End: 2021-01-06

## 2021-01-06 LAB
ALBUMIN SERPL ELPH-MCNC: 3.7 G/DL — SIGNIFICANT CHANGE UP (ref 3.5–5.2)
ALP SERPL-CCNC: 57 U/L — SIGNIFICANT CHANGE UP (ref 30–115)
ALT FLD-CCNC: 31 U/L — SIGNIFICANT CHANGE UP (ref 0–41)
ANION GAP SERPL CALC-SCNC: 11 MMOL/L — SIGNIFICANT CHANGE UP (ref 7–14)
APTT BLD: 27.9 SEC — SIGNIFICANT CHANGE UP (ref 27–39.2)
AST SERPL-CCNC: 34 U/L — SIGNIFICANT CHANGE UP (ref 0–41)
BILIRUB SERPL-MCNC: 1.3 MG/DL — HIGH (ref 0.2–1.2)
BLD GP AB SCN SERPL QL: SIGNIFICANT CHANGE UP
BUN SERPL-MCNC: 14 MG/DL — SIGNIFICANT CHANGE UP (ref 10–20)
CALCIUM SERPL-MCNC: 8.8 MG/DL — SIGNIFICANT CHANGE UP (ref 8.5–10.1)
CHLORIDE SERPL-SCNC: 101 MMOL/L — SIGNIFICANT CHANGE UP (ref 98–110)
CO2 SERPL-SCNC: 24 MMOL/L — SIGNIFICANT CHANGE UP (ref 17–32)
CREAT SERPL-MCNC: 1 MG/DL — SIGNIFICANT CHANGE UP (ref 0.7–1.5)
GLUCOSE BLDC GLUCOMTR-MCNC: 147 MG/DL — HIGH (ref 70–99)
GLUCOSE BLDC GLUCOMTR-MCNC: 218 MG/DL — HIGH (ref 70–99)
GLUCOSE BLDC GLUCOMTR-MCNC: 236 MG/DL — HIGH (ref 70–99)
GLUCOSE BLDC GLUCOMTR-MCNC: 274 MG/DL — HIGH (ref 70–99)
GLUCOSE BLDC GLUCOMTR-MCNC: 281 MG/DL — HIGH (ref 70–99)
GLUCOSE SERPL-MCNC: 214 MG/DL — HIGH (ref 70–99)
HCT VFR BLD CALC: 31.9 % — LOW (ref 37–47)
HGB BLD-MCNC: 10.8 G/DL — LOW (ref 12–16)
INR BLD: 1.34 RATIO — HIGH (ref 0.65–1.3)
MCHC RBC-ENTMCNC: 28.7 PG — SIGNIFICANT CHANGE UP (ref 27–31)
MCHC RBC-ENTMCNC: 33.9 G/DL — SIGNIFICANT CHANGE UP (ref 32–37)
MCV RBC AUTO: 84.8 FL — SIGNIFICANT CHANGE UP (ref 81–99)
NRBC # BLD: 0 /100 WBCS — SIGNIFICANT CHANGE UP (ref 0–0)
PLATELET # BLD AUTO: 120 K/UL — LOW (ref 130–400)
POTASSIUM SERPL-MCNC: 4 MMOL/L — SIGNIFICANT CHANGE UP (ref 3.5–5)
POTASSIUM SERPL-SCNC: 4 MMOL/L — SIGNIFICANT CHANGE UP (ref 3.5–5)
PROT SERPL-MCNC: 6.4 G/DL — SIGNIFICANT CHANGE UP (ref 6–8)
PROTHROM AB SERPL-ACNC: 15.4 SEC — HIGH (ref 9.95–12.87)
RBC # BLD: 3.76 M/UL — LOW (ref 4.2–5.4)
RBC # FLD: 13 % — SIGNIFICANT CHANGE UP (ref 11.5–14.5)
SARS-COV-2 RNA SPEC QL NAA+PROBE: SIGNIFICANT CHANGE UP
SODIUM SERPL-SCNC: 136 MMOL/L — SIGNIFICANT CHANGE UP (ref 135–146)
WBC # BLD: 4.34 K/UL — LOW (ref 4.8–10.8)
WBC # FLD AUTO: 4.34 K/UL — LOW (ref 4.8–10.8)

## 2021-01-06 PROCEDURE — 99233 SBSQ HOSP IP/OBS HIGH 50: CPT

## 2021-01-06 PROCEDURE — 88304 TISSUE EXAM BY PATHOLOGIST: CPT | Mod: 26

## 2021-01-06 RX ORDER — MORPHINE SULFATE 50 MG/1
4 CAPSULE, EXTENDED RELEASE ORAL EVERY 4 HOURS
Refills: 0 | Status: DISCONTINUED | OUTPATIENT
Start: 2021-01-06 | End: 2021-01-07

## 2021-01-06 RX ORDER — TAMSULOSIN HYDROCHLORIDE 0.4 MG/1
0.4 CAPSULE ORAL AT BEDTIME
Refills: 0 | Status: DISCONTINUED | OUTPATIENT
Start: 2021-01-06 | End: 2021-01-07

## 2021-01-06 RX ORDER — KETOROLAC TROMETHAMINE 30 MG/ML
15 SYRINGE (ML) INJECTION EVERY 6 HOURS
Refills: 0 | Status: DISCONTINUED | OUTPATIENT
Start: 2021-01-06 | End: 2021-01-07

## 2021-01-06 RX ORDER — HEPARIN SODIUM 5000 [USP'U]/ML
5000 INJECTION INTRAVENOUS; SUBCUTANEOUS EVERY 12 HOURS
Refills: 0 | Status: DISCONTINUED | OUTPATIENT
Start: 2021-01-06 | End: 2021-01-07

## 2021-01-06 RX ORDER — METRONIDAZOLE 500 MG
500 TABLET ORAL EVERY 8 HOURS
Refills: 0 | Status: DISCONTINUED | OUTPATIENT
Start: 2021-01-06 | End: 2021-01-07

## 2021-01-06 RX ORDER — ONDANSETRON 8 MG/1
4 TABLET, FILM COATED ORAL EVERY 8 HOURS
Refills: 0 | Status: DISCONTINUED | OUTPATIENT
Start: 2021-01-06 | End: 2021-01-07

## 2021-01-06 RX ORDER — CIPROFLOXACIN LACTATE 400MG/40ML
400 VIAL (ML) INTRAVENOUS EVERY 12 HOURS
Refills: 0 | Status: DISCONTINUED | OUTPATIENT
Start: 2021-01-06 | End: 2021-01-07

## 2021-01-06 RX ORDER — PANTOPRAZOLE SODIUM 20 MG/1
40 TABLET, DELAYED RELEASE ORAL DAILY
Refills: 0 | Status: DISCONTINUED | OUTPATIENT
Start: 2021-01-06 | End: 2021-01-07

## 2021-01-06 RX ORDER — LISINOPRIL 2.5 MG/1
5 TABLET ORAL DAILY
Refills: 0 | Status: DISCONTINUED | OUTPATIENT
Start: 2021-01-06 | End: 2021-01-07

## 2021-01-06 RX ORDER — ATORVASTATIN CALCIUM 80 MG/1
20 TABLET, FILM COATED ORAL AT BEDTIME
Refills: 0 | Status: DISCONTINUED | OUTPATIENT
Start: 2021-01-06 | End: 2021-01-07

## 2021-01-06 RX ORDER — LEVOTHYROXINE SODIUM 125 MCG
150 TABLET ORAL DAILY
Refills: 0 | Status: DISCONTINUED | OUTPATIENT
Start: 2021-01-06 | End: 2021-01-07

## 2021-01-06 RX ADMIN — ATORVASTATIN CALCIUM 20 MILLIGRAM(S): 80 TABLET, FILM COATED ORAL at 21:19

## 2021-01-06 RX ADMIN — Medication 200 MILLIGRAM(S): at 17:38

## 2021-01-06 RX ADMIN — PANTOPRAZOLE SODIUM 40 MILLIGRAM(S): 20 TABLET, DELAYED RELEASE ORAL at 10:22

## 2021-01-06 RX ADMIN — Medication 150 MICROGRAM(S): at 05:17

## 2021-01-06 RX ADMIN — Medication 100 MILLIGRAM(S): at 21:19

## 2021-01-06 RX ADMIN — LISINOPRIL 5 MILLIGRAM(S): 2.5 TABLET ORAL at 05:17

## 2021-01-06 RX ADMIN — TAMSULOSIN HYDROCHLORIDE 0.4 MILLIGRAM(S): 0.4 CAPSULE ORAL at 21:19

## 2021-01-06 RX ADMIN — Medication 200 MILLIGRAM(S): at 05:17

## 2021-01-06 RX ADMIN — Medication 100 MILLIGRAM(S): at 05:17

## 2021-01-06 NOTE — BRIEF OPERATIVE NOTE - OPERATION/FINDINGS
critical view obtained   cystic duct and cystic artery clipped and transected   jenna placed in gallbladder fossa prior to closure   umbilical port fascia closed with 0 Vicryl sutures, 3-0 Vicryl dermal sutures  skin incisions closed with 4-0 Monocryl   4x4 and Tegaderm dressings  30 mL Marcaine given locally gallbladder distended with signs of chronic inflammation   critical view obtained, cystic duct and cystic artery clipped and transected   jenna placed in gallbladder fossa prior to closure   umbilical port fascia closed with 0 Vicryl sutures, 3-0 Vicryl dermal sutures  skin incisions closed with 4-0 Monocryl   4x4 and Tegaderm dressings  30 mL Marcaine given locally

## 2021-01-06 NOTE — BRIEF OPERATIVE NOTE - NSICDXBRIEFPROCEDURE_GEN_ALL_CORE_FT
PROCEDURES:  Cholecystectomy, laparoscopic, using multiple ports 06-Jan-2021 15:09:58  Mavis Tejada

## 2021-01-06 NOTE — CONSULT NOTE ADULT - ASSESSMENT
BILIARY COLIC  NPO  IVF IV ABX  PAIN MGMT  ZOFRAN PRN  REPEAT  LABS /LFTS IN AM  DVT/ GI PROPHYLAXIS
Patient with above history. She is retired. She cooks cleans with out problems. No angina CHF symptoms. She  can walk multiple blocks She can go up multiple flights steps. No angina. She snores. Note she has probable significant AURA. Cardica risk surgery intermediate. DVT GI prophylaxis
RT  RENAL  COLIC  NPO  IVF  PAIN MGMT  WILL FOLLOW

## 2021-01-06 NOTE — PROGRESS NOTE ADULT - NUTRITIONAL ASSESSMENT
RUQ pain/Biliary colic    - Pt for OR today - lap cholecystectomy   - per cardio: intermediate risk  - keep NPO   - COVID 1/5: negative

## 2021-01-06 NOTE — PRE-ANESTHESIA EVALUATION ADULT - NSANTHOSAYNRD_GEN_A_CORE
denies/No. AURA screening performed.  STOP BANG Legend: 0-2 = LOW Risk; 3-4 = INTERMEDIATE Risk; 5-8 = HIGH Risk

## 2021-01-06 NOTE — PROGRESS NOTE ADULT - ASSESSMENT
Patient is a 55y old  Female who presents with a chief complaint of RUQ pain, being admitted for Biliary Colic.    # Biliary Colic  - RUQ sono: GB sludge, without the presence of pericholecystic fluid or gb wall thickening.  - NPO for sx today  - continue IVF  - HIDA negative  - COVID negative 1/5  - Patient ambulates without any SOB, denies chest pain.  She is at moderate risk for perioperative events.  Cardio eval appreciate - intermediate risk     # Right  ureteral stone  - CT abdomen: Obstructing 0.6 cm right ureteral stone just proximal to the uretero-vesicular junction with resulting moderate right-sided hydroureteronephrosis.  - pain control   - UA positive; urine culture negative   - Continue IV Cipro, IVF and IVF  - Stone is known to outpatient urologist Dr. Figueroa, patient is asymptomatic and needs to follow up outpatient     # HTN  - continue Lisinopril     # DM II  -continue monitoring  -start insulin if over 180 once patient is eating post sx    # DLD  - c/w atorvastatin     # Hypothyroid , Hx of Thyroid Ca s/p partial thyroidectomy  - c/w levothyroxine    # GI ppx  - continue PO Protonix     # DVT ppx   -  Lovenox SQ      Progress Note Handoff  Pending Consults: none  Pending Tests: labs  Pending Results: labs  Family Discussion: discussed NPO, IVF and sx with patient  - in agreement with plan of care   Disposition: Home___x__/SNF______/Other_____/Unknown at this time_____    Please call me with any questions at extension [phone]

## 2021-01-06 NOTE — CONSULT NOTE ADULT - SUBJECTIVE AND OBJECTIVE BOX
CARDIOLOGY CONSULT NOTE     CHIEF COMPLAINT/REASON FOR CONSULT:    HPI:  56 y/o female with a PMH of hypothyroidism, thyroid ca- s/p partial thyroidectomy , DM, HTN, and hypercholesterolemia presented to ER with right sided abdominal/flank pain since 1500 today. Pt reports associated nausea. She denies vomiting, fever/chills, dysuria, urinary frequency, or radiation of pain.   She admits to eating ribs 3 hrs prior to onset of pain.  Pt states she was recently diagnosed with a right sided renal calculus on CT Scan 3 weeks ago, following onset of gross hematuria. She reports only lower back pain at that time, which appeared to be musculoskeletal in nature. Pt was referred to urology (??Dr Forman??), renal/bladder sonogram was pertinent for right sided renal stone and she was scheduled for KUB tomorrow. Pt reports occasional episodes of painless gross hematuria since onset.     RUQ sono  showed gb sludge, without the presence of pericholecystic fluid or gb wall thickening.  CT A/P today was pertinent for mod right sided hydronephrosis, secondary to a 6x5 mm obstructing right UVJ stone. +delayed right sided nephrogram and p[erinephric stranding. Abdominopelvic organs and left kidney unremarkable on CT. (04 Jan 2021 00:21)      PAST MEDICAL & SURGICAL HISTORY:  Thyroid cancer    Hypothyroid    DM (diabetes mellitus)    HTN (hypertension)    High cholesterol    S/P appendectomy    H/O partial thyroidectomy        Cardiac Risks:   [ x]HTN, [ x] DM, [ ] Smoking, [x ] FH,  [x ] Lipids        MEDICATIONS:  MEDICATIONS  (STANDING):  atorvastatin 20 milliGRAM(s) Oral at bedtime  ciprofloxacin   IVPB 400 milliGRAM(s) IV Intermittent every 12 hours  heparin   Injectable 5000 Unit(s) SubCutaneous every 12 hours  influenza   Vaccine 0.5 milliLiter(s) IntraMuscular once  levothyroxine 150 MICROGram(s) Oral daily  lisinopril 5 milliGRAM(s) Oral daily  metroNIDAZOLE  IVPB 500 milliGRAM(s) IV Intermittent every 8 hours  pantoprazole  Injectable 40 milliGRAM(s) IV Push daily  sodium chloride 0.9%. 1000 milliLiter(s) (100 mL/Hr) IV Continuous <Continuous>  tamsulosin 0.4 milliGRAM(s) Oral at bedtime      FAMILY HISTORY:      SOCIAL HISTORY:      [ ] Marital status     Allergies    No Known Allergies      	    REVIEW OF SYSTEMS:  CONSTITUTIONAL: No fever, weight loss, or fatigue  EYES: No eye pain, visual disturbances, or discharge  ENMT:  No difficulty hearing, tinnitus, vertigo; No sinus or throat pain  NECK: No pain or stiffness  RESPIRATORY: No cough, wheezing, chills or hemoptysis; No Shortness of Breath  CARDIOVASCULAR: No chest pain, palpitations, passing out, dizziness, or leg swelling  GASTROINTESTINAL: See above  GENITOURINARY: No dysuria, frequency, hematuria, or incontinence  NEUROLOGICAL: No headaches, memory loss, loss of strength, numbness, or tremors  SKIN: No itching, burning, rashes, or lesions   	      PHYSICAL EXAM:  T(C): 36.3 (01-06-21 @ 05:36), Max: 36.8 (01-05-21 @ 14:43)  HR: 80 (01-06-21 @ 05:36) (77 - 80)  BP: 168/75 (01-06-21 @ 05:36) (134/79 - 168/75)  RR: 16 (01-06-21 @ 05:36) (16 - 18)  SpO2: --  Wt(kg): --  I&O's Summary    05 Jan 2021 07:01  -  06 Jan 2021 07:00  --------------------------------------------------------  IN: 0 mL / OUT: 1200 mL / NET: -1200 mL        Appearance: Normal	  Psychiatry: A & O x 3, Mood & affect appropriate  HEENT:   Normal oral mucosa, PERRL, EOMI	  Lymphatic: No lymphadenopathy  Cardiovascular: Normal S1 S2,RRR, No JVD, No murmurs  Respiratory: Lungs clear to auscultation	  Gastrointestinal:  Soft, Non-tender, + BS	  Skin: No rashes, No ecchymoses, No cyanosis	  Neurologic: Non-focal  Extremities: Normal range of motion, No clubbing, cyanosis or edema  Vascular: Peripheral pulses palpable 2+ bilaterally      ECG:  	  < from: 12 Lead ECG (01.03.21 @ 17:28) >    Diagnosis Line Normal sinus rhythm  Normal ECG    Confirmed by ROYA SOLANO MD ([id number]) on 1/4/2021 12:00:52 PM    < end of copied text >    	  LABS:	 	    CARDIAC MARKERS:                                    10.8   4.34  )-----------( 120      ( 06 Jan 2021 08:32 )             31.9     01-04    137  |  100  |  29<H>  ----------------------------<  213<H>  4.0   |  27  |  1.2    Ca    9.3      04 Jan 2021 11:00  Mg     1.7     01-04    TPro  6.9  /  Alb  4.2  /  TBili  0.9  /  DBili  <0.2  /  AST  27  /  ALT  29  /  AlkPhos  61  01-04    PT/INR - ( 06 Jan 2021 08:32 )   PT: 15.40 sec;   INR: 1.34 ratio         PTT - ( 06 Jan 2021 08:32 )  PTT:27.9 sec

## 2021-01-07 ENCOUNTER — TRANSCRIPTION ENCOUNTER (OUTPATIENT)
Age: 56
End: 2021-01-07

## 2021-01-07 VITALS
RESPIRATION RATE: 18 BRPM | DIASTOLIC BLOOD PRESSURE: 68 MMHG | SYSTOLIC BLOOD PRESSURE: 112 MMHG | HEART RATE: 75 BPM | TEMPERATURE: 97 F

## 2021-01-07 LAB
ALBUMIN SERPL ELPH-MCNC: 3.8 G/DL — SIGNIFICANT CHANGE UP (ref 3.5–5.2)
ALP SERPL-CCNC: 58 U/L — SIGNIFICANT CHANGE UP (ref 30–115)
ALT FLD-CCNC: 64 U/L — HIGH (ref 0–41)
ANION GAP SERPL CALC-SCNC: 12 MMOL/L — SIGNIFICANT CHANGE UP (ref 7–14)
AST SERPL-CCNC: 72 U/L — HIGH (ref 0–41)
BILIRUB SERPL-MCNC: 0.9 MG/DL — SIGNIFICANT CHANGE UP (ref 0.2–1.2)
BUN SERPL-MCNC: 18 MG/DL — SIGNIFICANT CHANGE UP (ref 10–20)
CALCIUM SERPL-MCNC: 9.1 MG/DL — SIGNIFICANT CHANGE UP (ref 8.5–10.1)
CHLORIDE SERPL-SCNC: 100 MMOL/L — SIGNIFICANT CHANGE UP (ref 98–110)
CO2 SERPL-SCNC: 23 MMOL/L — SIGNIFICANT CHANGE UP (ref 17–32)
CREAT SERPL-MCNC: 0.9 MG/DL — SIGNIFICANT CHANGE UP (ref 0.7–1.5)
GLUCOSE BLDC GLUCOMTR-MCNC: 219 MG/DL — HIGH (ref 70–99)
GLUCOSE BLDC GLUCOMTR-MCNC: 253 MG/DL — HIGH (ref 70–99)
GLUCOSE BLDC GLUCOMTR-MCNC: 255 MG/DL — HIGH (ref 70–99)
GLUCOSE SERPL-MCNC: 271 MG/DL — HIGH (ref 70–99)
HCT VFR BLD CALC: 33.3 % — LOW (ref 37–47)
HGB BLD-MCNC: 11.1 G/DL — LOW (ref 12–16)
MCHC RBC-ENTMCNC: 28.4 PG — SIGNIFICANT CHANGE UP (ref 27–31)
MCHC RBC-ENTMCNC: 33.3 G/DL — SIGNIFICANT CHANGE UP (ref 32–37)
MCV RBC AUTO: 85.2 FL — SIGNIFICANT CHANGE UP (ref 81–99)
NRBC # BLD: 0 /100 WBCS — SIGNIFICANT CHANGE UP (ref 0–0)
PLATELET # BLD AUTO: 126 K/UL — LOW (ref 130–400)
POTASSIUM SERPL-MCNC: 4.3 MMOL/L — SIGNIFICANT CHANGE UP (ref 3.5–5)
POTASSIUM SERPL-SCNC: 4.3 MMOL/L — SIGNIFICANT CHANGE UP (ref 3.5–5)
PROT SERPL-MCNC: 6.5 G/DL — SIGNIFICANT CHANGE UP (ref 6–8)
RBC # BLD: 3.91 M/UL — LOW (ref 4.2–5.4)
RBC # FLD: 13.2 % — SIGNIFICANT CHANGE UP (ref 11.5–14.5)
SODIUM SERPL-SCNC: 135 MMOL/L — SIGNIFICANT CHANGE UP (ref 135–146)
WBC # BLD: 5.52 K/UL — SIGNIFICANT CHANGE UP (ref 4.8–10.8)
WBC # FLD AUTO: 5.52 K/UL — SIGNIFICANT CHANGE UP (ref 4.8–10.8)

## 2021-01-07 PROCEDURE — 93970 EXTREMITY STUDY: CPT | Mod: 26

## 2021-01-07 RX ORDER — METFORMIN HYDROCHLORIDE 850 MG/1
850 TABLET ORAL ONCE
Refills: 0 | Status: COMPLETED | OUTPATIENT
Start: 2021-01-07 | End: 2021-01-07

## 2021-01-07 RX ADMIN — METFORMIN HYDROCHLORIDE 850 MILLIGRAM(S): 850 TABLET ORAL at 09:35

## 2021-01-07 RX ADMIN — Medication 100 MILLIGRAM(S): at 13:17

## 2021-01-07 RX ADMIN — PANTOPRAZOLE SODIUM 40 MILLIGRAM(S): 20 TABLET, DELAYED RELEASE ORAL at 12:11

## 2021-01-07 RX ADMIN — LISINOPRIL 5 MILLIGRAM(S): 2.5 TABLET ORAL at 05:31

## 2021-01-07 RX ADMIN — Medication 100 MILLIGRAM(S): at 05:31

## 2021-01-07 RX ADMIN — Medication 150 MICROGRAM(S): at 05:31

## 2021-01-07 RX ADMIN — Medication 200 MILLIGRAM(S): at 04:29

## 2021-01-07 NOTE — CHART NOTE - NSCHARTNOTEFT_GEN_A_CORE
PACU ANESTHESIA ADMISSION NOTE      Procedure: Cholecystectomy, laparoscopic, using multiple ports      Post op diagnosis:  Cholecystitis, acute        ____  Intubated  TV:______       Rate: ______      FiO2: ______    __x__  Patent Airway    __x__  Full return of protective reflexes    __x__  Full recovery from anesthesia / back to baseline status    Vitals:  T(C): 37.7 (01-06-21 @ 15:05), Max: 37.7 (01-06-21 @ 15:05)  HR: 96 (01-06-21 @ 15:05) (77 - 96)  BP: 109/55 (01-06-21 @ 15:05) (109/55 - 168/75)  RR: 20 (01-06-21 @ 15:05) (16 - 20)  SpO2: 94% (01-06-21 @ 15:05) (94% - 94%)    Mental Status:  __x__ Awake   __x___ Alert   _____ Drowsy   _____ Sedated    Nausea/Vomiting:  ____ NO  ___x___Yes,   See Post - Op Orders          Pain Scale (0-10):  _____    Treatment: ____ None    __x__ See Post - Op/PCA Orders    Post - Operative Fluids:   ____ Oral   __x__ See Post - Op Orders    Plan: Discharge:   ____Home       _x____Floor     _____Critical Care    _____  Other:_________________    Comments: uneventful anesthesia course no complications. VItals stable. Pt transferred to PACU
56 y/o female ss/p Lap. cholecystectomy. Seen and examined, in bed, NAD. No complaints, voided. Had full liquid without issues.   Incision; C/D/I.  A/P:  -Pain mgmt  -IV hydration  -Am labs  -Encourage incentive sudarshan  -Encourage OOB to chair/amb  -D/C planning in am  -GI and DVT prophylaxis
Patient seen and examined at bedside.  Complains of RUQ abdominal pain, no flank pain.  No urinary symptoms.  Patient had followed up outpatient last month with Dr. Figueroa, stone known, supposed to have KUB today.  Stone has been asymptomatic since discovery.  At this time, stone not likely culprit of patient's pain.  Advise to maintain adequate hydration and FU outpatient with Dr. Figueroa on discharge.  D/W Urology attending.
Px seen and examined labs notes and imaging reviewed   Px with no overnight events. Px reports improving pain ..      Biliary Colic  - RUQ sono: GB sludge, without the presence of pericholecystic fluid or gb wall thickening.  - continue NPO for now   - IVF  - Appreciate  Surgery recommendations    Right  ureteral stone  - CT abdomen: Obstructing 0.6 cm right ureteral stone just proximal to the uretero-vesicular junction with resulting moderate right-sided hydroureteronephrosis.  - pain control   - UA positive  - Continue abx  -Continue  VF @100  -Started on  Flomax in house, Continue     HTN  - continue Lisinopril     DM  - POC glucose  - start sliding scale insulin when FS>180    DLD  - c/w atorvastatin     Hypothyroid   Hx of Thyroid Ca s/p partial thyroidectomy  - c/w levothyroxine    GI ppx  - continue PO Protonix     DVT ppx   -  Lovenox SQ    Ambulate as tolerated    DASH diet, CHO consistent    Full code

## 2021-01-07 NOTE — DISCHARGE NOTE PROVIDER - NSDCCPCAREPLAN_GEN_ALL_CORE_FT
PRINCIPAL DISCHARGE DIAGNOSIS  Diagnosis: Gallbladder sludge  Assessment and Plan of Treatment:       SECONDARY DISCHARGE DIAGNOSES  Diagnosis: Nephrolithiasis  Assessment and Plan of Treatment:

## 2021-01-07 NOTE — DISCHARGE NOTE PROVIDER - NSDCMRMEDTOKEN_GEN_ALL_CORE_FT
atorvastatin: 20 milligram(s) orally once a day (at bedtime)  glimepiride: 2 milligram(s) orally once a day  levothyroxine: 150 microgram(s) orally once a day  lisinopril: 5 milligram(s) orally once a day  metFORMIN: 850 milligram(s) orally 2 times a day  tiZANidine 2 mg oral capsule: 1 cap(s) orally every 8 hours, As Needed for pain

## 2021-01-07 NOTE — DISCHARGE NOTE NURSING/CASE MANAGEMENT/SOCIAL WORK - PATIENT PORTAL LINK FT
You can access the FollowMyHealth Patient Portal offered by [facility] by registering at the following website: [email or web address]. By joining [employer]’s FollowMyHealth portal, you will also be able to view your health information using other applications (apps) compatible with our system.

## 2021-01-07 NOTE — DISCHARGE NOTE PROVIDER - NSDCCPTREATMENT_GEN_ALL_CORE_FT
PRINCIPAL PROCEDURE  Procedure: Cholecystectomy, laparoscopic, using multiple ports  Findings and Treatment:

## 2021-01-07 NOTE — DISCHARGE NOTE PROVIDER - NSDCFUADDINST_GEN_ALL_CORE_FT
If you experience increased abdominal pain, fevers or nausea/vomiting - call Dr. Fong or return to ER    May shower. May remove dressings in 3-4 days.     Please follow up with your urologist regarding the right sided 6x5 mm obstructing right ureteral stone.

## 2021-01-07 NOTE — PROGRESS NOTE ADULT - SUBJECTIVE AND OBJECTIVE BOX
JERILYN ABRAMS  55y  Female    CHIEF COMPLAINT:  Patient is a 55y old  Female who presents with a chief complaint of RUQ pain    INTERVAL HPI/OVERNIGHT EVENTS:  Patient seen and examined at bedside. Patient states she had a small meal last night after HIDA. Patient developed severe pain 2 hours after eating and was unable to ambulate or change positions. This am, ambulation and changing positions is tolerable but still painful. States pain is better with Toradol for a short time.     PAST MEDICAL HISTORY   PAST MEDICAL & SURGICAL HISTORY:  Thyroid cancer  Hypothyroid  DM (diabetes mellitus)  HTN (hypertension)  High cholesterol  S/P appendectomy  H/O partial thyroidectomy      HOME MEDICATIONS   Home Medications:  atorvastatin: 20 milligram(s) orally once a day (at bedtime) (04 Jan 2021 00:32)  glimepiride: 2 milligram(s) orally once a day (04 Jan 2021 00:32)  levothyroxine: 150 microgram(s) orally once a day (04 Jan 2021 00:32)  lisinopril: 5 milligram(s) orally once a day (04 Jan 2021 00:32)  metFORMIN: 850 milligram(s) orally 2 times a day (04 Jan 2021 00:32)    SOCIAL HISTORY   Social History:  Marital Status:  (  x )    (   ) Single    (   )    (  )   Lives with: (  ) alone  (  ) children   ( x ) spouse   (  ) parents  (  ) other  Recent Travel: No recent travel    Substance Use (street drugs): ( x ) never used  (  ) other:  Tobacco Usage:  ( x  ) never smoked   (   ) former smoker   (   ) current smoker  (     ) pack year  Alcohol Usage: None (04 Jan 2021 00:21)    FAMILY HISTORY   FAMILY HISTORY: None pertinent     ALLERGIES  No Known Allergies    REVIEW OF SYSTEMS:  Negative unless specified above     OBJECTIVES    VITAL SIGNS   Vital Signs Last 24 Hrs  T(C): 36 (05 Jan 2021 04:30), Max: 36.8 (04 Jan 2021 21:10)  T(F): 96.8 (05 Jan 2021 04:30), Max: 98.3 (04 Jan 2021 21:10)  HR: 74 (05 Jan 2021 04:30) (74 - 87)  BP: 139/86 (05 Jan 2021 04:30) (111/78 - 147/69)  RR: 18 (05 Jan 2021 04:30) (18 - 18)      PHYSICAL EXAM:  GENERAL: Laying supine, changing positions are painful   HEAD:  Atraumatic, Normocephalic  EYES: Conjunctiva and sclera clear  ENMT: Moist mucous membranes  NECK: Supple  NERVOUS SYSTEM:  Alert & Oriented X3, Good concentration  CHEST/LUNG: Clear to percussion bilaterally  HEART: Regular rate and rhythm  ABDOMEN: Soft, nondistended. +RUQ TTP   EXTREMITIES:  2+ Peripheral Pulses  SKIN: No rashes or lesions    CONSULTS  Consultant(s) Notes Reviewed:  [x ] YES  [ ] NO  Care Discussed with Consultants/Other Providers [ x] YES  [ ] NO    LABS:                        11.5   6.04  )-----------( 131      ( 04 Jan 2021 11:00 )             35.1     01-04    137  |  100  |  29<H>  ----------------------------<  213<H>  4.0   |  27  |  1.2    Ca    9.3      04 Jan 2021 11:00  Mg     1.7     01-04    TPro  6.9  /  Alb  4.2  /  TBili  0.9  /  DBili  <0.2  /  AST  27  /  ALT  29  /  AlkPhos  61  01-04    LIVER FUNCTIONS - ( 04 Jan 2021 11:00 )  Alb: 4.2 g/dL / Pro: 6.9 g/dL / ALK PHOS: 61 U/L / ALT: 29 U/L / AST: 27 U/L / GGT: x           CARDIAC MARKERS ( 03 Jan 2021 17:30 )  x     / <0.01 ng/mL / x     / x     / x              RADIOLOGY & ADDITIONAL TESTS:  Imaging Personally Reviewed:  [x] YES  [ ] NO  NM Gallbladder EF (01.04.21 @ 18:00)  IMPRESSION: NORMAL HEPATOBILIARY IMAGES,AND NORMAL EMPTYING OF THE GALLBLADDER AFTER CHOLECYSTOKININ INFUSION. THE GALLBLADDER IS VISUALIZED BY 30 MINUTES POST INJECTION, DEMONSTRATING PATENCY OF THE CYSTIC DUCT.  84 % OF THE RADIOLABELED BILE IN THE GALLBLADDER HAS EMPTIED BY 45 MINUTES POST CCK INFUSION (NORMAL - >38%).     Xray Kidney Ureter Bladder (01.04.21 @ 12:12)  impression:  Limited evaluation for previously seen right ureteral calculus as there is contrast opacification of the distal right ureter and bladder.  Nonobstructive bowel gas pattern.    CT Abdomen and Pelvis w/ IV Cont (01.03.21 @ 22:09)  IMPRESSION:  Obstructing 0.6 cm right ureteral stone just proximal to the ureterovesicular junction with resulting moderate right-sided hydroureteronephrosis.  Left lower lobe subpleural pulmonary nodules measuring up to 0.6 cm. Recommend 6 month CT chest follow-up.    US Abdomen Limited (01.03.21 @ 18:43)  IMPRESSION:  Gallbladder sludge with nosonographic evidence of acute cholecystitis.  Hepatic steatosis.  Right renal upper pole stone measuring 0.3 cm. Minimal dilatation of the collecting system.    Xray Chest 2 Views PA/Lat (01.03.21 @ 18:26)  Impression:  No radiographic evidence of acute cardiopulmonary disease.      MEDICATIONS  MEDICATIONS  (STANDING):  atorvastatin 20 milliGRAM(s) Oral at bedtime  ciprofloxacin   IVPB 400 milliGRAM(s) IV Intermittent every 12 hours  heparin   Injectable 5000 Unit(s) SubCutaneous every 8 hours  influenza   Vaccine 0.5 milliLiter(s) IntraMuscular once  levothyroxine 150 MICROGram(s) Oral daily  lisinopril 5 milliGRAM(s) Oral daily  metroNIDAZOLE  IVPB 500 milliGRAM(s) IV Intermittent every 8 hours  pantoprazole  Injectable 40 milliGRAM(s) IV Push daily  sodium chloride 0.9%. 1000 milliLiter(s) (100 mL/Hr) IV Continuous <Continuous>  tamsulosin 0.4 milliGRAM(s) Oral at bedtime    MEDICATIONS  (PRN):  morphine  - Injectable 4 milliGRAM(s) IV Push every 4 hours PRN Moderate Pain (4 - 6)  ondansetron Injectable 4 milliGRAM(s) IV Push every 8 hours PRN Nausea and/or Vomiting        
S; Pt doing well - having minimal abdominal pain and bloating; voiding well, some flatus, no BM yet. Eating lightly due to bloating  O; Vital Signs Last 24 Hrs  T(C): 36.3 (07 Jan 2021 14:13), Max: 37.7 (06 Jan 2021 15:05)  T(F): 97.3 (07 Jan 2021 14:13), Max: 99.9 (06 Jan 2021 15:05)  HR: 75 (07 Jan 2021 14:13) (71 - 96)  BP: 112/68 (07 Jan 2021 14:13) (101/59 - 140/68)  BP(mean): --  RR: 18 (07 Jan 2021 14:13) (17 - 23)  SpO2: 95% (06 Jan 2021 16:20) (94% - 96%)    MEDICATIONS  (STANDING):  atorvastatin 20 milliGRAM(s) Oral at bedtime  ciprofloxacin   IVPB 400 milliGRAM(s) IV Intermittent every 12 hours  heparin   Injectable 5000 Unit(s) SubCutaneous every 12 hours  levothyroxine 150 MICROGram(s) Oral daily  lisinopril 5 milliGRAM(s) Oral daily  metroNIDAZOLE  IVPB 500 milliGRAM(s) IV Intermittent every 8 hours  pantoprazole  Injectable 40 milliGRAM(s) IV Push daily  tamsulosin 0.4 milliGRAM(s) Oral at bedtime    MEDICATIONS  (PRN):  ketorolac   Injectable 15 milliGRAM(s) IV Push every 6 hours PRN Moderate Pain (4 - 6)  morphine  - Injectable 4 milliGRAM(s) IV Push every 4 hours PRN Moderate Pain (4 - 6)  ondansetron Injectable 4 milliGRAM(s) IV Push every 8 hours PRN Nausea and/or Vomiting    EXAM:  lungs: cta  cvs: s1s2  abd: soft, mildly distended; dressings c/d/i; mild ondina-incisional tenderness  ext: no LE edema/calf tenderness appreciated    Labs:  CAPILLARY BLOOD GLUCOSE      POCT Blood Glucose.: 255 mg/dL (07 Jan 2021 11:50)  POCT Blood Glucose.: 219 mg/dL (07 Jan 2021 07:41)  POCT Blood Glucose.: 274 mg/dL (06 Jan 2021 23:22)  POCT Blood Glucose.: 281 mg/dL (06 Jan 2021 21:12)  POCT Blood Glucose.: 236 mg/dL (06 Jan 2021 16:49)                          11.1   5.52  )-----------( 126      ( 07 Jan 2021 09:00 )             33.3         01-07    135  |  100  |  18  ----------------------------<  271<H>  4.3   |  23  |  0.9      Calcium, Total Serum: 9.1 mg/dL (01-07-21 @ 09:00)      LFTs:             6.5  | 0.9  | 72       ------------------[58      ( 07 Jan 2021 09:00 )  3.8  | x    | 64          Lipase:x      Amylase:x             Coags:     15.40  ----< 1.34    ( 06 Jan 2021 08:32 )     27.9                
DIAGNOSIS:   HOSPITAL DAY #:    STATUS POST:    POST OPERATIVE DAY #:     Vital Signs Last 24 Hrs  T(C): 36.8 (04 Jan 2021 21:10), Max: 37.2 (04 Jan 2021 01:26)  T(F): 98.3 (04 Jan 2021 21:10), Max: 99 (04 Jan 2021 01:26)  HR: 87 (04 Jan 2021 21:10) (79 - 87)  BP: 147/69 (04 Jan 2021 21:10) (111/78 - 147/69)  BP(mean): --  RR: 18 (04 Jan 2021 21:10) (18 - 18)  SpO2: 98% (04 Jan 2021 01:36) (98% - 98%)    SUBJECTIVE: Pt seen    Pain: YES  [ ]   NO [ ]   Nausea: [ ] YES [ ] NO           Vomiting: [ ] YES [ ] NO  Flatus: [ ] YES [ ] NO             Bowel Movement: [ ] YES [ ] NO     Void: [ ]YES [ ]No      JUANCHO DRAINAGE: SIGNIFICANT [ ]   NOT SIGNIFICANT [ ]   NOT APPLICABLE [ ]  YES [ ] NO    General Appearance: Appears well, NAD  Neck: Supple  Chest: Equal expansion bilaterally, equal breath sounds  CV: Pulse regular presently  Abdomen: Soft [x ] YES [ ]NO  DISTENDED [ ] YES [x ] NO TENDERNESS [ ]YES [ ]NO RUQ  INCISIONS: HEALING WELL [ ] YES  [ ] NO ERYTHEMA [ ] YES [ ] NO DRAINAGE [ ] YES  [ ] NO  Extremities: Grossly symmetric, CALF TENDERNESS [ ] YES  [ ] NO      LABS:                        11.5   6.04  )-----------( 131      ( 04 Jan 2021 11:00 )             35.1     01-04    137  |  100  |  29<H>  ----------------------------<  213<H>  4.0   |  27  |  1.2    Ca    9.3      04 Jan 2021 11:00  Mg     1.7     01-04    TPro  6.9  /  Alb  4.2  /  TBili  0.9  /  DBili  <0.2  /  AST  27  /  ALT  29  /  AlkPhos  61  01-04            ASSESSMENT: discussed case with DR BASILIO ROBLES POST OP COURSE [ ]  YES  [ ] NO  CONDITION IMPROVING  []  YES  [ ]  NO          PLAN: for hida scan    CONTINUE PRESENT MANAGEMENT  [ ] YES  [ ] NO                      
Patient seen and examined at bedside, resting comfortably.  HIDA was negative however patient reports had dinner last night and only after a few bites developed RUQ abdominal pain.  No N/V.  No fever    Vital Signs Last 24 Hrs  T(C): 36 (05 Jan 2021 04:30), Max: 36.8 (04 Jan 2021 21:10)  T(F): 96.8 (05 Jan 2021 04:30), Max: 98.3 (04 Jan 2021 21:10)  HR: 74 (05 Jan 2021 04:30) (74 - 87)  BP: 139/86 (05 Jan 2021 04:30) (111/78 - 147/69)  RR: 18 (05 Jan 2021 04:30) (18 - 18)  SpO2: --    General Appearance: NAD  Chest: CTA B/L  CV: S1 S2  Abdomen: Soft, + TTP RUQ Non Distended ,+bs,  no rebound/guarding  Extremities: No edema BLE  CNS: A/O x 3    MEDICATIONS  (STANDING):  atorvastatin 20 milliGRAM(s) Oral at bedtime  ciprofloxacin   IVPB 400 milliGRAM(s) IV Intermittent every 12 hours  heparin   Injectable 5000 Unit(s) SubCutaneous every 8 hours  influenza   Vaccine 0.5 milliLiter(s) IntraMuscular once  levothyroxine 150 MICROGram(s) Oral daily  lisinopril 5 milliGRAM(s) Oral daily  metroNIDAZOLE  IVPB 500 milliGRAM(s) IV Intermittent every 8 hours  pantoprazole  Injectable 40 milliGRAM(s) IV Push daily  sodium chloride 0.9%. 1000 milliLiter(s) (100 mL/Hr) IV Continuous <Continuous>  tamsulosin 0.4 milliGRAM(s) Oral at bedtime    MEDICATIONS  (PRN):  morphine  - Injectable 4 milliGRAM(s) IV Push every 4 hours PRN Moderate Pain (4 - 6)  ondansetron Injectable 4 milliGRAM(s) IV Push every 8 hours PRN Nausea and/or Vomiting      LABS:                        11.5   6.04  )-----------( 131      ( 04 Jan 2021 11:00 )             35.1     01-04    137  |  100  |  29<H>  ----------------------------<  213<H>  4.0   |  27  |  1.2    Ca    9.3      04 Jan 2021 11:00  Mg     1.7     01-04    TPro  6.9  /  Alb  4.2  /  TBili  0.9  /  DBili  <0.2  /  AST  27  /  ALT  29  /  AlkPhos  61  01-04      Urinalysis Basic - ( 03 Jan 2021 17:30 )    Color: Yellow / Appearance: Clear / SG: >=1.030 / pH: x  Gluc: x / Ketone: Negative  / Bili: Negative / Urobili: 0.2 mg/dL   Blood: x / Protein: 100 mg/dL / Nitrite: Negative   Leuk Esterase: Negative / RBC: 11-25 /HPF / WBC 3-5 /HPF   Sq Epi: x / Non Sq Epi: Few /HPF / Bacteria: TNTC /HPF        RADIOLOGY & ADDITIONAL STUDIES: Reviewed
S; Pt still with RUQ tenderness - unchanged  O; Vital Signs Last 24 Hrs  T(C): 36.3 (06 Jan 2021 05:36), Max: 36.8 (05 Jan 2021 14:43)  T(F): 97.4 (06 Jan 2021 05:36), Max: 98.2 (05 Jan 2021 14:43)  HR: 80 (06 Jan 2021 05:36) (77 - 80)  BP: 168/75 (06 Jan 2021 05:36) (134/79 - 168/75)  BP(mean): --  RR: 16 (06 Jan 2021 05:36) (16 - 18)  SpO2: --    MEDICATIONS  (STANDING):  atorvastatin 20 milliGRAM(s) Oral at bedtime  ciprofloxacin   IVPB 400 milliGRAM(s) IV Intermittent every 12 hours  heparin   Injectable 5000 Unit(s) SubCutaneous every 12 hours  influenza   Vaccine 0.5 milliLiter(s) IntraMuscular once  levothyroxine 150 MICROGram(s) Oral daily  lisinopril 5 milliGRAM(s) Oral daily  metroNIDAZOLE  IVPB 500 milliGRAM(s) IV Intermittent every 8 hours  pantoprazole  Injectable 40 milliGRAM(s) IV Push daily  sodium chloride 0.9%. 1000 milliLiter(s) (100 mL/Hr) IV Continuous <Continuous>  tamsulosin 0.4 milliGRAM(s) Oral at bedtime    MEDICATIONS  (PRN):  ketorolac   Injectable 15 milliGRAM(s) IV Push every 6 hours PRN Moderate Pain (4 - 6)  morphine  - Injectable 4 milliGRAM(s) IV Push every 4 hours PRN Moderate Pain (4 - 6)  ondansetron Injectable 4 milliGRAM(s) IV Push every 8 hours PRN Nausea and/or Vomiting      EXAM:  lungs: cta  cvs: s1s2  abd: soft, mild RUQ tenderness    Labs:  CAPILLARY BLOOD GLUCOSE      POCT Blood Glucose.: 218 mg/dL (06 Jan 2021 08:02)  POCT Blood Glucose.: 147 mg/dL (06 Jan 2021 02:47)  POCT Blood Glucose.: 185 mg/dL (05 Jan 2021 20:44)  POCT Blood Glucose.: 173 mg/dL (05 Jan 2021 16:16)  POCT Blood Glucose.: 179 mg/dL (05 Jan 2021 11:40)                          10.8   4.34  )-----------( 120      ( 06 Jan 2021 08:32 )             31.9         01-06    136  |  101  |  14  ----------------------------<  214<H>  4.0   |  24  |  1.0      Calcium, Total Serum: 8.8 mg/dL (01-06-21 @ 08:32)      LFTs:             6.4  | 1.3  | 34       ------------------[57      ( 06 Jan 2021 08:32 )  3.7  | x    | 31          Lipase:x      Amylase:x         Lactate, Blood: 2.5 mmol/L (01-03-21 @ 17:30)      Coags:     15.40  ----< 1.34    ( 06 Jan 2021 08:32 )     27.9        Culture - Urine (collected 03 Jan 2021 19:39)  Source: .Urine Clean Catch (Midstream)  Final Report (04 Jan 2021 21:44):    No growth    COVID-19 PCR . (01.05.21 @ 11:00)   COVID-19 PCR: NotDetec: Methodology:   
  JERILYN ABRAMS  55y  Female      Patient is a 55y old  Female who presents with Biliary Colic (05 Jan 2021 10:29)      INTERVAL HPI/OVERNIGHT EVENTS:  Patient seen and examined earlier this morning.  Lying comfortably in bed. No complaints.  Denies SOB or cp.  NPO for sx today      REVIEW OF SYSTEMS:  CONSTITUTIONAL: No fever, weight loss, or fatigue  EYES: No eye pain, visual disturbances, or discharge  ENMT:  No difficulty hearing, tinnitus, vertigo; No sinus or throat pain  NECK: No pain or stiffness  RESPIRATORY: No cough, wheezing, chills or hemoptysis; No shortness of breath  CARDIOVASCULAR: No chest pain, palpitations, dizziness, or leg swelling  GASTROINTESTINAL: No abdominal or epigastric pain. No nausea, vomiting, or hematemesis; No diarrhea or constipation. No melena or hematochezia.  GENITOURINARY: No dysuria, frequency, hematuria, or incontinence  NEUROLOGICAL: No headaches, memory loss, loss of strength, numbness, or tremors  SKIN: No itching, burning, rashes, or lesions   LYMPH NODES: No enlarged glands  ENDOCRINE: No heat or cold intolerance; No hair loss  MUSCULOSKELETAL: No joint pain or swelling; No muscle, back, or extremity pain  PSYCHIATRIC: No depression, anxiety, mood swings, or difficulty sleeping  HEME/LYMPH: No easy bruising, or bleeding gums  ALLERY AND IMMUNOLOGIC: No hives or eczema    T(C): 36.3 (01-06-21 @ 05:36), Max: 36.8 (01-05-21 @ 14:43)  HR: 80 (01-06-21 @ 05:36) (77 - 80)  BP: 168/75 (01-06-21 @ 05:36) (134/79 - 168/75)  RR: 16 (01-06-21 @ 05:36) (16 - 18)  SpO2: --    PHYSICAL EXAM:  GENERAL: NAD, well-groomed, well-developed  HEAD:  Atraumatic, Normocephalic  EYES: EOMI, PERRLA, conjunctiva and sclera clear  ENMT: No tonsillar erythema, exudates, or enlargement; Moist mucous membranes, Good dentition, No lesions  NECK: Supple, No JVD, Normal thyroid  NERVOUS SYSTEM:  Alert & Oriented X3, Good concentration; Motor Strength 5/5 B/L upper and lower extremities; DTRs 2+ intact and symmetric  CHEST/LUNG: Clear to percussion bilaterally; No rales, rhonchi, wheezing, or rubs  HEART: Regular rate and rhythm; No murmurs, rubs, or gallops  ABDOMEN: Soft, TTP RUQ, Nondistended; Bowel sounds present  EXTREMITIES:  2+ Peripheral Pulses, No clubbing, cyanosis, or edema  LYMPH: No lymphadenopathy noted  SKIN: No rashes or lesions    Consultant(s) Notes Reviewed:  [x ] YES  [ ] NO  Care Discussed with Consultants/Other Providers [ x] YES  [ ] NO    LAB:                        10.8   4.34  )-----------( 120      ( 06 Jan 2021 08:32 )             31.9     01-06    136  |  101  |  14  ----------------------------<  214<H>  4.0   |  24  |  1.0    Ca    8.8      06 Jan 2021 08:32    TPro  6.4  /  Alb  3.7  /  TBili  1.3<H>  /  DBili  x   /  AST  34  /  ALT  31  /  AlkPhos  57  01-06    LIVER FUNCTIONS - ( 06 Jan 2021 08:32 )  Alb: 3.7 g/dL / Pro: 6.4 g/dL / ALK PHOS: 57 U/L / ALT: 31 U/L / AST: 34 U/L / GGT: x                   Drug Dosing Weight  Height (cm): 162.6 (04 Jan 2021 01:26)  Weight (kg): 84.4 (04 Jan 2021 01:26)  BMI (kg/m2): 31.9 (04 Jan 2021 01:26)  BSA (m2): 1.9 (04 Jan 2021 01:26)    CAPILLARY BLOOD GLUCOSE  POCT Blood Glucose.: 218 mg/dL (06 Jan 2021 08:02)  POCT Blood Glucose.: 147 mg/dL (06 Jan 2021 02:47)  POCT Blood Glucose.: 185 mg/dL (05 Jan 2021 20:44)  POCT Blood Glucose.: 173 mg/dL (05 Jan 2021 16:16)  POCT Blood Glucose.: 179 mg/dL (05 Jan 2021 11:40)        I&O's Summary    05 Jan 2021 07:01  -  06 Jan 2021 07:00  --------------------------------------------------------  IN: 0 mL / OUT: 1200 mL / NET: -1200 mL          RADIOLOGY & ADDITIONAL TESTS:  Imaging Personally Reviewed:  [x] YES  [ ] NO    HEALTH ISSUES - PROBLEM Dx:  Calculus of right ureter  Calculus of right ureter    Gallbladder sludge  Gallbladder sludge          MEDS:  atorvastatin 20 milliGRAM(s) Oral at bedtime  ciprofloxacin   IVPB 400 milliGRAM(s) IV Intermittent every 12 hours  heparin   Injectable 5000 Unit(s) SubCutaneous every 12 hours  influenza   Vaccine 0.5 milliLiter(s) IntraMuscular once  ketorolac   Injectable 15 milliGRAM(s) IV Push every 6 hours PRN  levothyroxine 150 MICROGram(s) Oral daily  lisinopril 5 milliGRAM(s) Oral daily  metroNIDAZOLE  IVPB 500 milliGRAM(s) IV Intermittent every 8 hours  morphine  - Injectable 4 milliGRAM(s) IV Push every 4 hours PRN  ondansetron Injectable 4 milliGRAM(s) IV Push every 8 hours PRN  pantoprazole  Injectable 40 milliGRAM(s) IV Push daily  sodium chloride 0.9%. 1000 milliLiter(s) IV Continuous <Continuous>  tamsulosin 0.4 milliGRAM(s) Oral at bedtime

## 2021-01-07 NOTE — DISCHARGE NOTE PROVIDER - HOSPITAL COURSE
HPI:  56 y/o female with a PMH of hypothyroidism, thyroid ca- s/p partial thyroidectomy , DM, HTN, and hypercholesterolemia presented to ER with right sided abdominal/flank pain since 1500 today. Pt reports associated nausea. She denies vomiting, fever/chills, dysuria, urinary frequency, or radiation of pain.   She admits to eating ribs 3 hrs prior to onset of pain.  Pt states she was recently diagnosed with a right sided renal calculus on CT Scan 3 weeks ago, following onset of gross hematuria. She reports only lower back pain at that time, which appeared to be musculoskeletal in nature. Pt was referred to urology (??Dr Forman??), renal/bladder sonogram was pertinent for right sided renal stone and she was scheduled for KUB tomorrow. Pt reports occasional episodes of painless gross hematuria since onset.     RUQ sono today showed gb sludge, without the presence of pericholecystic fluid or gb wall thickening.  CT A/P today was pertinent for mod right sided hydronephrosis, secondary to a 6x5 mm obstructing right UVJ stone. +delayed right sided nephrogram and p[erinephric stranding. Abdominopelvic organs and left kidney unremarkable on CT. (04 Jan 2021 00:21)      [facility]:   Patient seen and examined at bedside.  Complains of RUQ abdominal pain, no flank pain.  No urinary symptoms.  Patient had followed up outpatient last month with Dr. Figueroa, stone known, supposed to have KUB today.  Stone has been asymptomatic since discovery.  At this time, stone not likely culprit of patient's pain.  Advise to maintain adequate hydration and FU outpatient with Dr. Figueroa on discharge.  D/W Urology attending.    She had la negative HIDA scan and is now s/p laparoscopic cholecystectomy on 1/6. She remained afebrile and was D/C'd home on 1/7

## 2021-01-07 NOTE — DISCHARGE NOTE PROVIDER - CARE PROVIDER_API CALL
Jayda Fong  SURGERY  [address] [place] [address]  Phone: [phone]  Fax: [phone]  Follow Up Time: 2 weeks

## 2021-01-08 LAB — SURGICAL PATHOLOGY STUDY: SIGNIFICANT CHANGE UP

## 2021-01-14 DIAGNOSIS — K76.0 FATTY (CHANGE OF) LIVER, NOT ELSEWHERE CLASSIFIED: ICD-10-CM

## 2021-01-14 DIAGNOSIS — K81.0 ACUTE CHOLECYSTITIS: ICD-10-CM

## 2021-01-14 DIAGNOSIS — R31.0 GROSS HEMATURIA: ICD-10-CM

## 2021-01-14 DIAGNOSIS — Z79.84 LONG TERM (CURRENT) USE OF ORAL HYPOGLYCEMIC DRUGS: ICD-10-CM

## 2021-01-14 DIAGNOSIS — E89.0 POSTPROCEDURAL HYPOTHYROIDISM: ICD-10-CM

## 2021-01-14 DIAGNOSIS — R10.12 LEFT UPPER QUADRANT PAIN: ICD-10-CM

## 2021-01-14 DIAGNOSIS — Z85.850 PERSONAL HISTORY OF MALIGNANT NEOPLASM OF THYROID: ICD-10-CM

## 2021-01-14 DIAGNOSIS — Z90.49 ACQUIRED ABSENCE OF OTHER SPECIFIED PARTS OF DIGESTIVE TRACT: ICD-10-CM

## 2021-01-14 DIAGNOSIS — Z79.890 HORMONE REPLACEMENT THERAPY: ICD-10-CM

## 2021-01-14 DIAGNOSIS — I10 ESSENTIAL (PRIMARY) HYPERTENSION: ICD-10-CM

## 2021-01-14 DIAGNOSIS — K83.8 OTHER SPECIFIED DISEASES OF BILIARY TRACT: ICD-10-CM

## 2021-01-14 DIAGNOSIS — G47.33 OBSTRUCTIVE SLEEP APNEA (ADULT) (PEDIATRIC): ICD-10-CM

## 2021-01-14 DIAGNOSIS — E78.00 PURE HYPERCHOLESTEROLEMIA, UNSPECIFIED: ICD-10-CM

## 2021-01-14 DIAGNOSIS — N13.2 HYDRONEPHROSIS WITH RENAL AND URETERAL CALCULOUS OBSTRUCTION: ICD-10-CM

## 2021-01-14 DIAGNOSIS — K66.0 PERITONEAL ADHESIONS (POSTPROCEDURAL) (POSTINFECTION): ICD-10-CM

## 2021-01-14 DIAGNOSIS — E11.9 TYPE 2 DIABETES MELLITUS WITHOUT COMPLICATIONS: ICD-10-CM

## 2021-02-17 RX ORDER — GLIMEPIRIDE 1 MG
2 TABLET ORAL
Qty: 0 | Refills: 0 | DISCHARGE

## 2021-02-17 RX ORDER — LEVOTHYROXINE SODIUM 125 MCG
150 TABLET ORAL
Qty: 0 | Refills: 0 | DISCHARGE

## 2021-02-17 RX ORDER — ATORVASTATIN CALCIUM 80 MG/1
20 TABLET, FILM COATED ORAL
Qty: 0 | Refills: 0 | DISCHARGE

## 2021-02-17 RX ORDER — LISINOPRIL 2.5 MG/1
5 TABLET ORAL
Qty: 0 | Refills: 0 | DISCHARGE

## 2021-02-17 RX ORDER — GLIMEPIRIDE 1 MG
0 TABLET ORAL
Qty: 0 | Refills: 0 | DISCHARGE

## 2021-02-17 RX ORDER — LISINOPRIL 2.5 MG/1
0 TABLET ORAL
Qty: 0 | Refills: 0 | DISCHARGE

## 2021-02-17 RX ORDER — ATORVASTATIN CALCIUM 80 MG/1
0 TABLET, FILM COATED ORAL
Qty: 0 | Refills: 0 | DISCHARGE

## 2021-02-17 RX ORDER — LEVOTHYROXINE SODIUM 125 MCG
0 TABLET ORAL
Qty: 0 | Refills: 0 | DISCHARGE

## 2021-02-17 RX ORDER — METFORMIN HYDROCHLORIDE 850 MG/1
850 TABLET ORAL
Qty: 0 | Refills: 0 | DISCHARGE

## 2021-02-17 RX ORDER — METFORMIN HYDROCHLORIDE 850 MG/1
0 TABLET ORAL
Qty: 0 | Refills: 0 | DISCHARGE

## 2021-02-28 PROBLEM — I10 ESSENTIAL (PRIMARY) HYPERTENSION: Chronic | Status: ACTIVE | Noted: 2018-10-26

## 2021-02-28 PROBLEM — E78.00 PURE HYPERCHOLESTEROLEMIA, UNSPECIFIED: Chronic | Status: ACTIVE | Noted: 2018-10-26

## 2021-02-28 PROBLEM — E03.9 HYPOTHYROIDISM, UNSPECIFIED: Chronic | Status: ACTIVE | Noted: 2018-10-26

## 2021-02-28 PROBLEM — C73 MALIGNANT NEOPLASM OF THYROID GLAND: Chronic | Status: ACTIVE | Noted: 2018-10-26

## 2021-02-28 PROBLEM — E11.9 TYPE 2 DIABETES MELLITUS WITHOUT COMPLICATIONS: Chronic | Status: ACTIVE | Noted: 2018-10-26

## 2021-03-22 PROBLEM — Z00.00 ENCOUNTER FOR PREVENTIVE HEALTH EXAMINATION: Noted: 2021-03-22

## 2021-04-19 ENCOUNTER — APPOINTMENT (OUTPATIENT)
Dept: ENDOCRINOLOGY | Facility: CLINIC | Age: 56
End: 2021-04-19
Payer: MEDICARE

## 2021-04-19 VITALS
WEIGHT: 172 LBS | OXYGEN SATURATION: 98 % | SYSTOLIC BLOOD PRESSURE: 120 MMHG | BODY MASS INDEX: 29.37 KG/M2 | DIASTOLIC BLOOD PRESSURE: 70 MMHG | TEMPERATURE: 97.9 F | HEART RATE: 68 BPM | HEIGHT: 64 IN

## 2021-04-19 PROCEDURE — 99213 OFFICE O/P EST LOW 20 MIN: CPT

## 2021-04-19 RX ORDER — LEVOTHYROXINE SODIUM 150 UG/1
150 TABLET ORAL
Refills: 0 | Status: DISCONTINUED | COMMUNITY
End: 2021-04-19

## 2021-04-19 RX ORDER — ROSUVASTATIN CALCIUM 10 MG/1
10 TABLET, FILM COATED ORAL
Refills: 0 | Status: DISCONTINUED | COMMUNITY
End: 2021-04-19

## 2021-04-19 RX ORDER — METFORMIN HYDROCHLORIDE 500 MG/1
500 TABLET, COATED ORAL
Refills: 0 | Status: DISCONTINUED | COMMUNITY
End: 2021-04-19

## 2021-04-19 RX ORDER — GLIMEPIRIDE 4 MG/1
TABLET ORAL
Refills: 0 | Status: DISCONTINUED | COMMUNITY
End: 2021-04-19

## 2021-04-19 RX ORDER — LISINOPRIL 10 MG/1
10 TABLET ORAL
Refills: 0 | Status: DISCONTINUED | COMMUNITY
End: 2021-04-19

## 2021-04-19 NOTE — ASSESSMENT
[FreeTextEntry1] : The pt. has history of thyroid carcinoma with total thyroidectomy. Pt. has been followed with thyroglobulin with most recent level  1.0 and imaging with U/S (2/6/2020 which showed new growth in L bed of area of previous partial thyroidectomy and then recheck on 2/20/2020 which did not reveal anything that could be biopsied. . . Goal has been to suppress TSH with normal T4 and T3 and clinical euthyroid state. Her TSH is not suppressed and  increase in Synthroid discussed. Risks of subclinical hyperthyroid reviewed in great detail including BMD and cardiac issues.\par I had a long discussion with patient regarding diabetes control including home readings. Goal HbA1c was discussed  (6.0) and counseling provided regarding diet/exercise and complications of diabetes (renal and cardiac and neurologic as well as  and need for eye exams and examination of feet. Lipids and importance of BP control also reviewed. HbA1c currently at 6.1 from 8.3 and microalbumin 34 mcg/mg creatinine.\par Lipid levels were reviewed with patient and importance and function of LDL, HDL and triglycerides discussed. Methods to increase HDL (exercise, fish, beans, oat meal, legumes etc.) discussed with pt. in conjunction with measures to decrease LDL and triglycerides  including diet and exercise.LDL/HDL was 68/38 . Triglycerides were 93.Current regimen of treatment to continue. Risks/benefits  of statins were discussed in detail. \par Vit B12 and D were normal.\par \par \par \par

## 2021-06-24 ENCOUNTER — APPOINTMENT (OUTPATIENT)
Age: 56
End: 2021-06-24
Payer: MEDICARE

## 2021-06-24 VITALS
RESPIRATION RATE: 14 BRPM | DIASTOLIC BLOOD PRESSURE: 72 MMHG | BODY MASS INDEX: 29.37 KG/M2 | OXYGEN SATURATION: 97 % | HEART RATE: 72 BPM | WEIGHT: 172 LBS | HEIGHT: 64 IN | SYSTOLIC BLOOD PRESSURE: 126 MMHG

## 2021-06-24 PROCEDURE — 99213 OFFICE O/P EST LOW 20 MIN: CPT | Mod: 25

## 2021-06-24 PROCEDURE — 71046 X-RAY EXAM CHEST 2 VIEWS: CPT

## 2021-06-24 NOTE — HISTORY OF PRESENT ILLNESS
[Snoring] : snoring [Unrefreshing Sleep] : unrefreshing sleep [Currently Experiencing] : The patient is currently experiencing symptoms. [Follow-Up - Routine Clinic] : a routine clinic follow-up of [None] : The patient is currently asymptomatic

## 2021-06-24 NOTE — ASSESSMENT
[FreeTextEntry1] : Mediastinal nodule and lung nodules table since 2008 \par Possible [facility] not interested in testing

## 2021-06-24 NOTE — PROCEDURE
[FreeTextEntry1] : CXR PA and Lateral \par The costophrenic and cardiophrenic angles are sharp\par The edwin parenchyma shows no infiltrates, consolidations, or nodules \par The Mediastinum is within normal limits\par No pleural effusions\par

## 2021-07-13 ENCOUNTER — OUTPATIENT (OUTPATIENT)
Dept: OUTPATIENT SERVICES | Facility: HOSPITAL | Age: 56
LOS: 1 days | Discharge: HOME | End: 2021-07-13

## 2021-07-13 ENCOUNTER — LABORATORY RESULT (OUTPATIENT)
Age: 56
End: 2021-07-13

## 2021-07-13 DIAGNOSIS — Z98.890 OTHER SPECIFIED POSTPROCEDURAL STATES: Chronic | ICD-10-CM

## 2021-07-13 DIAGNOSIS — Z11.59 ENCOUNTER FOR SCREENING FOR OTHER VIRAL DISEASES: ICD-10-CM

## 2021-07-13 DIAGNOSIS — Z90.49 ACQUIRED ABSENCE OF OTHER SPECIFIED PARTS OF DIGESTIVE TRACT: Chronic | ICD-10-CM

## 2021-07-16 ENCOUNTER — OUTPATIENT (OUTPATIENT)
Dept: OUTPATIENT SERVICES | Facility: HOSPITAL | Age: 56
LOS: 1 days | Discharge: HOME | End: 2021-07-16
Payer: MEDICARE

## 2021-07-16 DIAGNOSIS — R06.02 SHORTNESS OF BREATH: ICD-10-CM

## 2021-07-16 DIAGNOSIS — Z98.890 OTHER SPECIFIED POSTPROCEDURAL STATES: Chronic | ICD-10-CM

## 2021-07-16 DIAGNOSIS — Z90.49 ACQUIRED ABSENCE OF OTHER SPECIFIED PARTS OF DIGESTIVE TRACT: Chronic | ICD-10-CM

## 2021-07-16 PROCEDURE — 94727 GAS DIL/WSHOT DETER LNG VOL: CPT | Mod: 26

## 2021-07-16 PROCEDURE — 94060 EVALUATION OF WHEEZING: CPT | Mod: 26

## 2021-07-16 PROCEDURE — 94729 DIFFUSING CAPACITY: CPT | Mod: 26

## 2021-08-02 ENCOUNTER — APPOINTMENT (OUTPATIENT)
Dept: PULMONOLOGY | Facility: CLINIC | Age: 56
End: 2021-08-02

## 2021-08-02 ENCOUNTER — APPOINTMENT (OUTPATIENT)
Age: 56
End: 2021-08-02

## 2021-10-28 RX ORDER — LEVOTHYROXINE SODIUM 0.15 MG/1
150 TABLET ORAL DAILY
Qty: 90 | Refills: 3 | Status: DISCONTINUED | COMMUNITY
End: 2021-10-28

## 2021-11-01 ENCOUNTER — APPOINTMENT (OUTPATIENT)
Dept: ENDOCRINOLOGY | Facility: CLINIC | Age: 56
End: 2021-11-01
Payer: MEDICARE

## 2021-11-01 VITALS
HEART RATE: 74 BPM | HEIGHT: 64 IN | BODY MASS INDEX: 29.88 KG/M2 | WEIGHT: 175 LBS | TEMPERATURE: 97.3 F | OXYGEN SATURATION: 98 % | DIASTOLIC BLOOD PRESSURE: 72 MMHG | SYSTOLIC BLOOD PRESSURE: 126 MMHG

## 2021-11-01 PROCEDURE — 99212 OFFICE O/P EST SF 10 MIN: CPT

## 2022-04-18 ENCOUNTER — APPOINTMENT (OUTPATIENT)
Dept: ENDOCRINOLOGY | Facility: CLINIC | Age: 57
End: 2022-04-18

## 2022-04-25 ENCOUNTER — APPOINTMENT (OUTPATIENT)
Dept: ENDOCRINOLOGY | Facility: CLINIC | Age: 57
End: 2022-04-25
Payer: MEDICARE

## 2022-04-25 VITALS
OXYGEN SATURATION: 98 % | HEART RATE: 74 BPM | SYSTOLIC BLOOD PRESSURE: 124 MMHG | TEMPERATURE: 97.9 F | WEIGHT: 181 LBS | DIASTOLIC BLOOD PRESSURE: 72 MMHG | HEIGHT: 64 IN | BODY MASS INDEX: 30.9 KG/M2

## 2022-04-25 PROCEDURE — 99213 OFFICE O/P EST LOW 20 MIN: CPT

## 2022-04-25 NOTE — ASSESSMENT
[FreeTextEntry1] : The pt. has history of thyroid carcinoma with total thyroidectomy. Pt. has been followed with thyroglobulin with most current level 1.1 from 2.0 from  1.0  and imaging with U/S c/w (4/19/2022 from 10/25/2021  ) with small R nodule of 3 mm unchanged and stable area of left at 5 mm from 7 mm. .  Goal has been to suppress TSH with normal T4 and T3 and clinical euthyroid state. Her TSH is not suppressed and  increase in Synthroid discussed but given decrease in thyroglobulin and low normal TSH will stay with current. . Risks of subclinical hyperthyroid reviewed in great detail including BMD and cardiac issues.\par I had a long discussion with patient regarding diabetes control including home readings. Goal HbA1c was discussed  (6.0) and counseling provided regarding diet/exercise and complications of diabetes (renal and cardiac and neurologic as well as  and need for eye exams and examination of feet. Lipids and importance of BP control also reviewed. HbA1c currently at 6.8 from 6.9 up from  6.1 .Suggested increase Metformin to 1000 bid. \par Lipid levels were reviewed with patient and importance and function of LDL, HDL and triglycerides discussed. Methods to increase HDL (exercise, fish, beans, oat meal, legumes etc.) discussed with pt. in conjunction with measures to decrease LDL and triglycerides  including diet and exercise.LDL/HDL was 79/39 . Triglycerides were 73 from 82 .Current regimen of treatment with Atorvastatin 20  to continue. Risks/benefits  of statins were discussed in detail. \par Vit B12 ( 1315 )  and D ( 61.2 ) were normal.\par \par \par \par

## 2022-06-20 NOTE — ED ADULT TRIAGE NOTE - MODE OF ARRIVAL
Detail Level: Detailed Quality 130: Documentation Of Current Medications In The Medical Record: Current Medications Documented Private Vehicle

## 2022-07-12 ENCOUNTER — APPOINTMENT (OUTPATIENT)
Age: 57
End: 2022-07-12

## 2022-10-13 ENCOUNTER — APPOINTMENT (OUTPATIENT)
Dept: ENDOCRINOLOGY | Facility: CLINIC | Age: 57
End: 2022-10-13

## 2022-10-13 VITALS
HEART RATE: 77 BPM | OXYGEN SATURATION: 98 % | DIASTOLIC BLOOD PRESSURE: 74 MMHG | HEIGHT: 64 IN | SYSTOLIC BLOOD PRESSURE: 124 MMHG | WEIGHT: 183 LBS | TEMPERATURE: 97.6 F | BODY MASS INDEX: 31.24 KG/M2

## 2022-10-13 PROCEDURE — 99212 OFFICE O/P EST SF 10 MIN: CPT

## 2022-10-13 NOTE — ASSESSMENT
[FreeTextEntry1] : The pt. has history of thyroid carcinoma with left thyroidectomy. Pt. has been followed with thyroglobulin with most current level 1.5 from 1.1  and imaging with U/S  (10/6/2022 c/w 4/19/2022 & 10/25/2021  ) with no visualization of previous small R nodule of 3 mm . There were no suspicious lymph nodes.  .  Goal has been to suppress TSH with normal T4 and T3 and clinical euthyroid state. Her TSH is not suppressed and  increase in Synthroid discussed but given decrease in thyroglobulin and low normal TSH will stay with current. . Risks of subclinical hyperthyroid reviewed in great detail including BMD and cardiac issues.\par I had a long discussion with patient regarding diabetes control including home readings. Goal HbA1c was discussed  (6.0) and counseling provided regarding diet/exercise and complications of diabetes (renal and cardiac and neurologic as well as  and need for eye exams and examination of feet. Lipids and importance of BP control also reviewed. HbA1c currently at 7.7 from 6.8. .Suggested increase Metformin to 1000 bid. and discussed GLP 1. but patient is resistant. \par Lipid levels were reviewed with patient and importance and function of LDL, HDL and triglycerides discussed. Methods to increase HDL (exercise, fish, beans, oat meal, legumes etc.) discussed with pt. in conjunction with measures to decrease LDL and triglycerides  including diet and exercise.LDL/HDL was up to 109/38 from 79/39 . Triglycerides were 91 from 73 . Current regimen of treatment with Atorvastatin 20  to continue. Risks/benefits  of statins were discussed in detail. \par The previous Vit B12 ( 1315 )  and D ( 61.2 ) were normal.\par \par \par \par

## 2022-10-21 ENCOUNTER — APPOINTMENT (OUTPATIENT)
Dept: ORTHOPEDIC SURGERY | Facility: CLINIC | Age: 57
End: 2022-10-21

## 2022-10-21 VITALS — BODY MASS INDEX: 31.24 KG/M2 | WEIGHT: 183 LBS | HEIGHT: 64 IN

## 2022-10-21 DIAGNOSIS — M79.89 OTHER SPECIFIED SOFT TISSUE DISORDERS: ICD-10-CM

## 2022-10-21 PROCEDURE — 99203 OFFICE O/P NEW LOW 30 MIN: CPT

## 2022-10-21 PROCEDURE — 73140 X-RAY EXAM OF FINGER(S): CPT | Mod: FY,LT

## 2022-10-21 NOTE — PHYSICAL EXAM
[de-identified] :   Physical exam of her left ring finger:  She has a well tissue on the volar side of the digit over the proximal phalanx.  It is nontender to the touch.  It is firm.  She can make a full fist.  She can flex and extend the MCP, PIP, and DIP joint of all 10 digits.  Her sensory and motor are intact.

## 2022-10-21 NOTE — DISCUSSION/SUMMARY
[de-identified] :   X-rays were discussed with the patient.\par I recommend that she monitors the soft tissue mass to see if there are any changes over the next 4-6 weeks and then follows up with Dr. Pabon for repeat evaluation and possible surgical consult for excision of the mass.\par She is amendable to this plan.  All questions were answered today.\par If anything changes prior to her appointment, she will give us a call.

## 2022-10-21 NOTE — HISTORY OF PRESENT ILLNESS
[de-identified] : Patient is a 57-year-old female here for evaluation of her left ring finger.  She started noticing a month on the digit.  She denies any pain. She denies any pain. She feels it got a little bigger over the last month.

## 2022-10-21 NOTE — DATA REVIEWED
[FreeTextEntry1] :   X-rays taken the office today of her left ring finger show no acute displaced fractures or bony abnormalities.

## 2022-10-24 ENCOUNTER — NON-APPOINTMENT (OUTPATIENT)
Age: 57
End: 2022-10-24

## 2022-10-24 ENCOUNTER — APPOINTMENT (OUTPATIENT)
Dept: ORTHOPEDIC SURGERY | Facility: CLINIC | Age: 57
End: 2022-10-24

## 2022-10-24 VITALS — BODY MASS INDEX: 33.12 KG/M2 | HEIGHT: 64 IN | WEIGHT: 194 LBS

## 2022-10-24 DIAGNOSIS — S70.11XA CONTUSION OF RIGHT THIGH, INITIAL ENCOUNTER: ICD-10-CM

## 2022-10-24 PROCEDURE — 73562 X-RAY EXAM OF KNEE 3: CPT | Mod: RT

## 2022-10-24 PROCEDURE — 99203 OFFICE O/P NEW LOW 30 MIN: CPT

## 2022-10-24 PROCEDURE — 73552 X-RAY EXAM OF FEMUR 2/>: CPT | Mod: RT

## 2022-10-24 PROCEDURE — 99213 OFFICE O/P EST LOW 20 MIN: CPT

## 2022-10-24 RX ORDER — NABUMETONE 750 MG/1
750 TABLET, FILM COATED ORAL
Qty: 60 | Refills: 0 | Status: ACTIVE | COMMUNITY
Start: 2022-10-24 | End: 1900-01-01

## 2022-10-24 RX ORDER — TIZANIDINE 4 MG/1
4 TABLET ORAL
Qty: 30 | Refills: 0 | Status: ACTIVE | COMMUNITY
Start: 2022-10-24 | End: 1900-01-01

## 2022-10-24 NOTE — IMAGING
[de-identified] :  X-rays taken of the patient's right femur and knee in the office today revealed no obvious fractures, subluxations, or dislocations.  Mild osteoarthritic changes of the hip and knee noted

## 2022-10-24 NOTE — DISCUSSION/SUMMARY
[de-identified] : Patient's right thigh was Ace wrapped for support/stability.  She may continue to weightbear as tolerated.  The patient was advised to rest/ice the area and can alternate with warm compresses as needed.  \par \par Epsom salt and warm water bath was encouraged.  Explained to the patient that this may take 4-6 weeks to fully heal and that the first two weeks are usually the worst.\par \par Nabumetone 750 mg and tizanidine 4 mg was sent to the patient's pharmacy to help alleviate her symptoms.  Patient will follow-up in 3-4 weeks for further evaluation.  At that point, if the patient is still in pain, will consider further imaging studies.  All of the patient's questions/concerns were answered in detail.  \par \par The patient was seen under the supervision of Dr. Fong.\par

## 2022-10-24 NOTE — PHYSICAL EXAM
[2+] : posterior tibialis pulse: 2+ [Right] : right knee [5___] : hamstring 5[unfilled]/5 [Equivocal] : equivocal Tenzin [FreeTextEntry9] :   F [] : no calf tenderness [TWNoteComboBox7] : flexion 120 degrees [de-identified] : extension 0 degrees

## 2022-10-24 NOTE — HISTORY OF PRESENT ILLNESS
[de-identified] :  Patient is a 57-year-old female who reports office for evaluation of her right knee/thigh pain that has been aggravating her since earlier today, 10/24/2022.  She states that she accidentally tripped in a manhole which caused her right knee/leg to twist awkwardly and her to fall.  Since the injury, walking, range of motion, and palpating certain areas of the knee and thigh aggravate the patient's pain.  Denies any numbness or tingling.

## 2022-11-08 ENCOUNTER — APPOINTMENT (OUTPATIENT)
Dept: ORTHOPEDIC SURGERY | Facility: CLINIC | Age: 57
End: 2022-11-08

## 2022-11-08 PROCEDURE — 99203 OFFICE O/P NEW LOW 30 MIN: CPT

## 2022-11-09 ENCOUNTER — APPOINTMENT (OUTPATIENT)
Dept: MRI IMAGING | Facility: CLINIC | Age: 57
End: 2022-11-09

## 2022-11-09 PROCEDURE — 73721 MRI JNT OF LWR EXTRE W/O DYE: CPT | Mod: RT,MH

## 2022-11-09 NOTE — HISTORY OF PRESENT ILLNESS
[de-identified] : Patient here for evaluation right knee pain.\par Had a injury and fall\par distal hamstring and knee pain\par \par NAD\par Right knee\par No skin breakdown\par Medial joint line ttp\par Positive tripp\par Negative lachman\par Negative varus/valgus instability\par ROM 0-130\par Pain with forced extension and flexion\par NVI\par Compartments soft and NT\par TTP distal posterior hamsrting, no gap\par \par Xray reviewed and significant for right knee mild degenerative changes\par \par Plan\par went over findings\par needs an mri right knee to evaluate meniscus\par pt\par pain control\par fu after mri

## 2022-11-16 ENCOUNTER — RX CHANGE (OUTPATIENT)
Age: 57
End: 2022-11-16

## 2022-11-21 ENCOUNTER — APPOINTMENT (OUTPATIENT)
Dept: ORTHOPEDIC SURGERY | Facility: CLINIC | Age: 57
End: 2022-11-21

## 2022-11-21 DIAGNOSIS — R22.31 LOCALIZED SWELLING, MASS AND LUMP, RIGHT UPPER LIMB: ICD-10-CM

## 2022-11-21 PROCEDURE — 99214 OFFICE O/P EST MOD 30 MIN: CPT

## 2022-11-21 NOTE — ASSESSMENT
[FreeTextEntry1] :  Patient is a mass in the left ring finger she will be set up for surgical excision of the mass.  Risks and benefits of the surgery discussed.  Surgery done under local anesthesia.  Id she will see us back time surgical intervention.

## 2022-11-21 NOTE — DATA REVIEWED
[FreeTextEntry1] :   Radiographs three views of the left ring finger reviewed showing no fracture dislocation no degenerative changes no masses

## 2022-11-21 NOTE — PHYSICAL EXAM
[de-identified] :   Patient has full range of motion of the left ring finger.  The mass present cause stiff PIP joint on the volar side.  It has the consistency of a cyst.  Id there is no Tinel's associated with it.  Mass is mobile.  The skin is mobile over the mass as well.

## 2022-11-21 NOTE — HISTORY OF PRESENT ILLNESS
[de-identified] : 57-year-old female comes in with a mass present on her left ring finger.  The mass is been present for couple of months.  It it does not significantly bother her with pain discomfort there is a mass present on the finger.  And she would like it further evaluated.

## 2022-11-22 ENCOUNTER — APPOINTMENT (OUTPATIENT)
Dept: ORTHOPEDIC SURGERY | Facility: CLINIC | Age: 57
End: 2022-11-22

## 2022-11-29 ENCOUNTER — APPOINTMENT (OUTPATIENT)
Dept: ORTHOPEDIC SURGERY | Facility: CLINIC | Age: 57
End: 2022-11-29

## 2022-11-29 PROCEDURE — 99213 OFFICE O/P EST LOW 20 MIN: CPT

## 2022-11-29 NOTE — HISTORY OF PRESENT ILLNESS
[de-identified] : Patient here for evaluation right knee pain.\par Had a injury and fall\par distal hamstring and knee pain\par \par NAD\par Right knee\par No skin breakdown\par Medial joint line ttp\par Positive tripp\par Negative lachman\par Negative varus/valgus instability\par ROM 0-130\par Pain with forced extension and flexion\par NVI\par Compartments soft and NT\par TTP distal posterior hamsrting, no gap\par \par Xray reviewed and significant for right knee mild degenerative changes\par \par mri right knee\par IMPRESSION:\par 1. Tricompartmental chondral loss, mild effusion, synovitis, and small popliteal cyst with mild ACL sprain and surrounding \par synovitis.\par 2. Medial and lateral meniscal degeneration without evidence of tear.\par 3. Significant motion degrades image quality on all imaging sequences.\par 4. No acute fracture or acute bone contusion is suspected.\par \par Plan\par went over findings\par explained the mri and tears and arthritis\par needs to start pt\par pain control as needed \par spoke about possible injections if no improvement

## 2023-01-12 ENCOUNTER — APPOINTMENT (OUTPATIENT)
Dept: ORTHOPEDIC SURGERY | Facility: AMBULATORY SURGERY CENTER | Age: 58
End: 2023-01-12

## 2023-01-18 ENCOUNTER — FORM ENCOUNTER (OUTPATIENT)
Age: 58
End: 2023-01-18

## 2023-01-19 ENCOUNTER — APPOINTMENT (OUTPATIENT)
Dept: ORTHOPEDIC SURGERY | Facility: CLINIC | Age: 58
End: 2023-01-19

## 2023-02-28 ENCOUNTER — APPOINTMENT (OUTPATIENT)
Dept: ORTHOPEDIC SURGERY | Facility: CLINIC | Age: 58
End: 2023-02-28
Payer: MEDICARE

## 2023-02-28 PROCEDURE — 99213 OFFICE O/P EST LOW 20 MIN: CPT

## 2023-02-28 RX ORDER — DICLOFENAC SODIUM 75 MG/1
75 TABLET, DELAYED RELEASE ORAL
Qty: 60 | Refills: 1 | Status: ACTIVE | COMMUNITY
Start: 2023-02-28 | End: 1900-01-01

## 2023-02-28 NOTE — HISTORY OF PRESENT ILLNESS
[de-identified] : Patient here for evaluation right knee pain.\par Had a injury and fall\par distal hamstring and knee pain\par \par PT was helping but recently had to go away and hasn't done pt, scheduled to restart\par \par NAD\par Right knee\par No skin breakdown\par Medial joint line ttp\par Positive tripp\par Negative lachman\par Negative varus/valgus instability\par ROM 0-130\par Pain with forced extension and flexion\par NVI\par Compartments soft and NT\par TTP distal posterior hamsrting, no gap\par \par Xray reviewed and significant for right knee mild degenerative changes\par \par mri right knee\par IMPRESSION:\par 1. Tricompartmental chondral loss, mild effusion, synovitis, and small popliteal cyst with mild ACL sprain and surrounding \par synovitis.\par 2. Medial and lateral meniscal degeneration without evidence of tear.\par 3. Significant motion degrades image quality on all imaging sequences.\par 4. No acute fracture or acute bone contusion is suspected.\par \par Plan\par went over findings\par needs to cont pt\par mobic sent for pain\par fu in 2 months\par

## 2023-03-03 RX ORDER — BLOOD SUGAR DIAGNOSTIC
STRIP MISCELLANEOUS
Qty: 300 | Refills: 3 | Status: ACTIVE | COMMUNITY
Start: 2022-08-01 | End: 1900-01-01

## 2023-03-07 NOTE — ASU PATIENT PROFILE, ADULT - AS SC BRADEN ACTIVITY
Blood collected, vital signs stable, family updated. Patients oxygen saturation 100 % on room air (4) walks frequently

## 2023-03-08 ENCOUNTER — APPOINTMENT (OUTPATIENT)
Dept: ORTHOPEDIC SURGERY | Facility: AMBULATORY SURGERY CENTER | Age: 58
End: 2023-03-08
Payer: MEDICARE

## 2023-03-08 ENCOUNTER — RESULT REVIEW (OUTPATIENT)
Age: 58
End: 2023-03-08

## 2023-03-08 ENCOUNTER — TRANSCRIPTION ENCOUNTER (OUTPATIENT)
Age: 58
End: 2023-03-08

## 2023-03-08 ENCOUNTER — OUTPATIENT (OUTPATIENT)
Dept: INPATIENT UNIT | Facility: HOSPITAL | Age: 58
LOS: 1 days | Discharge: HOME | End: 2023-03-08
Payer: MEDICARE

## 2023-03-08 VITALS
SYSTOLIC BLOOD PRESSURE: 164 MMHG | OXYGEN SATURATION: 97 % | HEART RATE: 85 BPM | TEMPERATURE: 99 F | RESPIRATION RATE: 20 BRPM | DIASTOLIC BLOOD PRESSURE: 91 MMHG

## 2023-03-08 VITALS
TEMPERATURE: 98 F | WEIGHT: 175.05 LBS | HEIGHT: 64 IN | OXYGEN SATURATION: 98 % | RESPIRATION RATE: 18 BRPM | HEART RATE: 70 BPM | SYSTOLIC BLOOD PRESSURE: 177 MMHG | DIASTOLIC BLOOD PRESSURE: 87 MMHG

## 2023-03-08 DIAGNOSIS — E03.9 HYPOTHYROIDISM, UNSPECIFIED: ICD-10-CM

## 2023-03-08 DIAGNOSIS — R22.32 LOCALIZED SWELLING, MASS AND LUMP, LEFT UPPER LIMB: ICD-10-CM

## 2023-03-08 DIAGNOSIS — Z79.84 LONG TERM (CURRENT) USE OF ORAL HYPOGLYCEMIC DRUGS: ICD-10-CM

## 2023-03-08 DIAGNOSIS — D18.01 HEMANGIOMA OF SKIN AND SUBCUTANEOUS TISSUE: ICD-10-CM

## 2023-03-08 DIAGNOSIS — E78.5 HYPERLIPIDEMIA, UNSPECIFIED: ICD-10-CM

## 2023-03-08 DIAGNOSIS — E11.9 TYPE 2 DIABETES MELLITUS WITHOUT COMPLICATIONS: ICD-10-CM

## 2023-03-08 DIAGNOSIS — Z90.49 ACQUIRED ABSENCE OF OTHER SPECIFIED PARTS OF DIGESTIVE TRACT: Chronic | ICD-10-CM

## 2023-03-08 DIAGNOSIS — G47.33 OBSTRUCTIVE SLEEP APNEA (ADULT) (PEDIATRIC): ICD-10-CM

## 2023-03-08 DIAGNOSIS — Z98.890 OTHER SPECIFIED POSTPROCEDURAL STATES: Chronic | ICD-10-CM

## 2023-03-08 PROCEDURE — 26160 REMOVE TENDON SHEATH LESION: CPT | Mod: F3

## 2023-03-08 PROCEDURE — 88304 TISSUE EXAM BY PATHOLOGIST: CPT

## 2023-03-08 PROCEDURE — 88304 TISSUE EXAM BY PATHOLOGIST: CPT | Mod: 26

## 2023-03-08 RX ORDER — IBUPROFEN 200 MG
1 TABLET ORAL
Qty: 20 | Refills: 0
Start: 2023-03-08

## 2023-03-08 NOTE — ASU DISCHARGE PLAN (ADULT/PEDIATRIC) - NS MD DC FALL RISK RISK
For information on Fall & Injury Prevention, visit: [email or web address] OR  [email or web address] OR  https://www.cdc.gov/steadi/patient.html

## 2023-03-08 NOTE — ASU DISCHARGE PLAN (ADULT/PEDIATRIC) - ASU DC SPECIAL INSTRUCTIONSFT

## 2023-03-10 LAB — SURGICAL PATHOLOGY STUDY: SIGNIFICANT CHANGE UP

## 2023-03-15 ENCOUNTER — RX RENEWAL (OUTPATIENT)
Age: 58
End: 2023-03-15

## 2023-03-15 ENCOUNTER — APPOINTMENT (OUTPATIENT)
Dept: PULMONOLOGY | Facility: CLINIC | Age: 58
End: 2023-03-15
Payer: MEDICARE

## 2023-03-15 VITALS
WEIGHT: 183 LBS | HEIGHT: 64 IN | DIASTOLIC BLOOD PRESSURE: 80 MMHG | OXYGEN SATURATION: 96 % | HEART RATE: 80 BPM | SYSTOLIC BLOOD PRESSURE: 130 MMHG | RESPIRATION RATE: 12 BRPM | BODY MASS INDEX: 31.24 KG/M2

## 2023-03-15 PROCEDURE — 99213 OFFICE O/P EST LOW 20 MIN: CPT | Mod: 25

## 2023-03-15 PROCEDURE — 94010 BREATHING CAPACITY TEST: CPT

## 2023-03-15 NOTE — ASSESSMENT
[FreeTextEntry1] : Mediastinal nodule and lung nodules table since 2008 \par Possible [facility] not interested in testing \par SP recetn CT abdomen with some nodules seen on the lung cuts

## 2023-03-16 ENCOUNTER — RX RENEWAL (OUTPATIENT)
Age: 58
End: 2023-03-16

## 2023-03-16 RX ORDER — PEN NEEDLE, DIABETIC 31 GX5/16"
NEEDLE, DISPOSABLE MISCELLANEOUS
Qty: 100 | Refills: 3 | Status: ACTIVE | COMMUNITY
Start: 1900-01-01 | End: 1900-01-01

## 2023-03-21 ENCOUNTER — APPOINTMENT (OUTPATIENT)
Dept: ORTHOPEDIC SURGERY | Facility: CLINIC | Age: 58
End: 2023-03-21
Payer: MEDICARE

## 2023-03-21 ENCOUNTER — NON-APPOINTMENT (OUTPATIENT)
Age: 58
End: 2023-03-21

## 2023-03-21 VITALS — HEIGHT: 64 IN | WEIGHT: 183 LBS | BODY MASS INDEX: 31.24 KG/M2

## 2023-03-21 DIAGNOSIS — R22.32 LOCALIZED SWELLING, MASS AND LUMP, LEFT UPPER LIMB: ICD-10-CM

## 2023-03-21 PROCEDURE — 99024 POSTOP FOLLOW-UP VISIT: CPT

## 2023-03-21 NOTE — HISTORY OF PRESENT ILLNESS
[de-identified] : Patient is a 57-year-old female here for her first postop appointment.  She is status post a left fourth digit mass excision done by Dr. Pabon.  She is feeling well.

## 2023-03-21 NOTE — PHYSICAL EXAM
[de-identified] : Physical exam of her left ring finger:  Resolving swelling and ecchymosis.  The wound is clean and dry.  No signs of drainage, pus or infection. Good motion of the digits.\par

## 2023-03-21 NOTE — DISCUSSION/SUMMARY
[de-identified] : Today I removed the sutures.  \par The patient will work on range of motion and scar massage.  \par Pathology report confirmed a hemangioma.\par Swelling of the digit can linger for 4-6 weeks. \par Follow up prn if there is continued pain and/or swelling at that time.\par

## 2023-04-10 ENCOUNTER — APPOINTMENT (OUTPATIENT)
Dept: ENDOCRINOLOGY | Facility: CLINIC | Age: 58
End: 2023-04-10
Payer: MEDICARE

## 2023-04-10 PROCEDURE — 99442: CPT | Mod: 95

## 2023-04-18 ENCOUNTER — APPOINTMENT (OUTPATIENT)
Dept: ORTHOPEDIC SURGERY | Facility: CLINIC | Age: 58
End: 2023-04-18
Payer: MEDICARE

## 2023-04-18 VITALS — HEIGHT: 64 IN

## 2023-04-18 PROCEDURE — 99213 OFFICE O/P EST LOW 20 MIN: CPT | Mod: 24,25

## 2023-04-18 PROCEDURE — 20550 NJX 1 TENDON SHEATH/LIGAMENT: CPT | Mod: 79,LT

## 2023-04-18 NOTE — PROCEDURE
[FreeTextEntry3] : de Quervains injection was performed because of pain inflammation and stiffness\par Anesthesia: ethyl chloride sprayed topically\par Dexamethsone:  1 cc of 4mg/ml\par Lidocaine: An injection of Lidocaine 1% 1cc\par \par Patient has tried OTC's including aspirin, Ibuprofen, Aleve etc or prescription NSAIDS, and/or exercises at home and/ or\par physical therapy without satisfactory response.\par After verbal consent using sterile preparation and technique. The risks, benefits, and alternatives to cortisone injection\par were explained in full to the patient. Risks outlined include but are not limited to infection, sepsis, bleeding, scarring, skin\par discoloration, temporary increase in pain, syncopal episode, failure to resolve symptoms, allergic reaction, symptom\par recurrence, and elevation of blood sugar in diabetics. Patient understood the risks. All questions were answered. After\par discussion of options, patient requested an injection. Oral informed consent was obtained and sterile prep was done of the\par injection site. Sterile technique was utilized for the procedure including the preparation of the solutions used for the\par injection. Patient tolerated the procedure well. Advised to ice the injection site this evening.\par Prep with Etoh locally to site. Sterile technique used tendon sheath of first compartment was injected\par Left wrist first compartment

## 2023-04-18 NOTE — HISTORY OF PRESENT ILLNESS
[de-identified] : 57-year-old female has pain discomfort to left wrist she comes in today for evaluation because of this pain and discomfort that is present and its been bothering her without any traumatic episode.

## 2023-04-18 NOTE — ASSESSMENT
[FreeTextEntry1] : Patient has left-sided Beltran veins tendinitis.  Cortisone injection today she tolerated that well we will see how the injection does elevated blood sugar was discussed arrangements to see us back in a month.  She is better she may see us as needed.  She is not better at all she will consider surgical intervention under local

## 2023-04-18 NOTE — PHYSICAL EXAM
[de-identified] : Has swelling across the first dorsal compartment positive Finkelstein's test normal sensation normal cap refill no erythema ecchymoses or abrasions but there is swelling present she had a previous mass excised from the ring finger at the level of the proximal phalanx palmar side.  Delivered a thickening and scarring around that area.

## 2023-05-03 ENCOUNTER — APPOINTMENT (OUTPATIENT)
Dept: PULMONOLOGY | Facility: CLINIC | Age: 58
End: 2023-05-03
Payer: MEDICARE

## 2023-05-03 DIAGNOSIS — G47.33 OBSTRUCTIVE SLEEP APNEA (ADULT) (PEDIATRIC): ICD-10-CM

## 2023-05-03 DIAGNOSIS — R91.8 OTHER NONSPECIFIC ABNORMAL FINDING OF LUNG FIELD: ICD-10-CM

## 2023-05-03 PROCEDURE — 99212 OFFICE O/P EST SF 10 MIN: CPT | Mod: 95

## 2023-05-03 NOTE — HISTORY OF PRESENT ILLNESS
[Home] : at home, [unfilled] , at the time of the visit. [Medical Office: ([facility])___] : at the medical office located in  [Verbal consent obtained from patient] : the patient, [unfilled] [Snoring] : snoring [Unrefreshing Sleep] : unrefreshing sleep [Currently Experiencing] : The patient is currently experiencing symptoms. [Follow-Up - Routine Clinic] : a routine clinic follow-up of [None] : The patient is currently asymptomatic

## 2023-05-03 NOTE — ASSESSMENT
[FreeTextEntry1] : Mediastinal nodule and lung nodules stable since 2008 \par Possible [facility] not interested in testing

## 2023-05-12 ENCOUNTER — APPOINTMENT (OUTPATIENT)
Dept: ORTHOPEDIC SURGERY | Facility: CLINIC | Age: 58
End: 2023-05-12

## 2023-05-22 ENCOUNTER — APPOINTMENT (OUTPATIENT)
Dept: ORTHOPEDIC SURGERY | Facility: CLINIC | Age: 58
End: 2023-05-22
Payer: MEDICARE

## 2023-05-22 VITALS — WEIGHT: 183 LBS | BODY MASS INDEX: 31.24 KG/M2 | HEIGHT: 64 IN

## 2023-05-22 DIAGNOSIS — M65.4 RADIAL STYLOID TENOSYNOVITIS [DE QUERVAIN]: ICD-10-CM

## 2023-05-22 PROCEDURE — 99213 OFFICE O/P EST LOW 20 MIN: CPT | Mod: 24,25

## 2023-05-22 PROCEDURE — 20550 NJX 1 TENDON SHEATH/LIGAMENT: CPT | Mod: 79,LT

## 2023-05-22 NOTE — PHYSICAL EXAM
[de-identified] : Patient is tender to palpation along the first dorsal compartment positive Finkelstein's test normal sensation normal cap refill no erythema ecchymoses or abrasions.

## 2023-05-22 NOTE — HISTORY OF PRESENT ILLNESS
[de-identified] : 57-year-old female with pain discomfort to left wrist that she did not get significantly better from her initial cortisone injection she comes in today for evaluation complaining about pain discomfort in the left wrist.

## 2023-06-23 ENCOUNTER — APPOINTMENT (OUTPATIENT)
Dept: ORTHOPEDIC SURGERY | Facility: CLINIC | Age: 58
End: 2023-06-23
Payer: MEDICARE

## 2023-06-23 DIAGNOSIS — S80.01XA CONTUSION OF RIGHT KNEE, INITIAL ENCOUNTER: ICD-10-CM

## 2023-06-23 PROCEDURE — 99213 OFFICE O/P EST LOW 20 MIN: CPT

## 2023-06-23 NOTE — HISTORY OF PRESENT ILLNESS
[de-identified] : Patient here for evaluation right knee pain.\par Had a injury and fall\par distal hamstring and knee pain\par \par PT was helping but recently had to go away and hasn't done pt, scheduled to restart\par \par NAD\par Right knee\par No skin breakdown\par Medial joint line ttp\par Positive tripp\par Negative lachman\par Negative varus/valgus instability\par ROM 0-130\par Pain with forced extension and flexion\par NVI\par Compartments soft and NT\par TTP distal posterior hamsrting, no gap\par \par Xray reviewed and significant for right knee mild degenerative changes\par \par mri right knee\par IMPRESSION:\par 1. Tricompartmental chondral loss, mild effusion, synovitis, and small popliteal cyst with mild ACL sprain and surrounding \par synovitis.\par 2. Medial and lateral meniscal degeneration without evidence of tear.\par 3. Significant motion degrades image quality on all imaging sequences.\par 4. No acute fracture or acute bone contusion is suspected.\par \par Plan\par went over findings\par explained the mri and oa\par tx options explained]\par will proceed with visco\par fu pending auth\par

## 2023-07-21 ENCOUNTER — APPOINTMENT (OUTPATIENT)
Dept: ORTHOPEDIC SURGERY | Facility: CLINIC | Age: 58
End: 2023-07-21

## 2023-08-03 ENCOUNTER — APPOINTMENT (OUTPATIENT)
Dept: ORTHOPEDIC SURGERY | Facility: CLINIC | Age: 58
End: 2023-08-03

## 2023-10-03 NOTE — ASSESSMENT
Pt informed [FreeTextEntry1] : The pt. has history of thyroid carcinoma with total thyroidectomy. Pt. has been followed with thyroglobulin with most recent level 2.0 from  1.0  and imaging with U/S (10/25/2021 c/w 2/6/2020  ) with small R nodule of 3 mm and stable area of left.  Goal has been to suppress TSH with normal T4 and T3 and clinical euthyroid state. Her TSH is not suppressed and  increase in Synthroid discussed. Risks of subclinical hyperthyroid reviewed in great detail including BMD and cardiac issues.\par I had a long discussion with patient regarding diabetes control including home readings. Goal HbA1c was discussed  (6.0) and counseling provided regarding diet/exercise and complications of diabetes (renal and cardiac and neurologic as well as  and need for eye exams and examination of feet. Lipids and importance of BP control also reviewed. HbA1c currently at 6.9 up from  6.1 .\par Lipid levels were reviewed with patient and importance and function of LDL, HDL and triglycerides discussed. Methods to increase HDL (exercise, fish, beans, oat meal, legumes etc.) discussed with pt. in conjunction with measures to decrease LDL and triglycerides  including diet and exercise.LDL/HDL was 93/40 from  68/38 . Triglycerides were 82 .Current regimen of treatment with Atorvastatin 20  to continue. Risks/benefits  of statins were discussed in detail. \par Vit B12 and D were normal.\par \par \par \par

## 2023-10-12 ENCOUNTER — APPOINTMENT (OUTPATIENT)
Dept: ENDOCRINOLOGY | Facility: CLINIC | Age: 58
End: 2023-10-12
Payer: MEDICARE

## 2023-10-12 VITALS
SYSTOLIC BLOOD PRESSURE: 130 MMHG | WEIGHT: 182 LBS | BODY MASS INDEX: 31.07 KG/M2 | OXYGEN SATURATION: 98 % | DIASTOLIC BLOOD PRESSURE: 76 MMHG | HEIGHT: 64 IN | HEART RATE: 82 BPM

## 2023-10-12 PROCEDURE — 99213 OFFICE O/P EST LOW 20 MIN: CPT

## 2023-10-16 ENCOUNTER — APPOINTMENT (OUTPATIENT)
Dept: ENDOCRINOLOGY | Facility: CLINIC | Age: 58
End: 2023-10-16

## 2023-11-09 NOTE — ASSESSMENT
Lab Results   Component Value Date    LDL 98.00 11/09/2023    TRIG 168 (H) 11/09/2023    HDL 50 11/09/2023    TOTALCHOLEST 4 11/09/2023     Continue Crestor 5mg daily  CT Calcium Score 0  - 4/2023  Stressed importance of dietary modifications. Follow a low cholesterol, low saturated fat diet with less that 200mg of cholesterol a day.  Avoid fried foods and high saturated fats (high saturated fats less than 7% of calories).  Add Flax Seed/Fish Oil supplements to diet. Increase dietary fiber.  Regular exercise can reduce LDL and raise HDL. Stressed importance of physical activity 5 times per week for 30 minutes per day.      [FreeTextEntry1] : Patient is left-sided dequervains tendinitis.  Patient will receive a second cortisone injection today.  If the injection does not work she will consider surgical intervention for dequervains release and benefits of surgery cut limited to bleeding infection risk injury stiffness discussed with the patient she understands she will see us back on an as-needed basis.

## 2024-04-04 ENCOUNTER — APPOINTMENT (OUTPATIENT)
Dept: ORTHOPEDIC SURGERY | Facility: CLINIC | Age: 59
End: 2024-04-04

## 2024-04-05 ENCOUNTER — APPOINTMENT (OUTPATIENT)
Dept: ORTHOPEDIC SURGERY | Facility: CLINIC | Age: 59
End: 2024-04-05
Payer: MEDICARE

## 2024-04-05 PROCEDURE — 99213 OFFICE O/P EST LOW 20 MIN: CPT

## 2024-04-05 NOTE — ASSESSMENT
[FreeTextEntry1] : Patient is a left ring finger trigger digit.  Patient will end up getting a cortisone injection at another visit.  She has blood work coming up in the next couple of days.  She does not want to artificially elevate her blood sugar.  She will see us back at the time of that visit for cortisone injection.  The possibility of surgical release was discussed.

## 2024-04-05 NOTE — HISTORY OF PRESENT ILLNESS
[de-identified] : 58-year-old female with a left ring finger trigger digit.  It developed about a year after her mass excision from her ring finger.  She comes in today for evaluation.  She is complaining about pain discomfort and locking of the finger.  She is a diabetic.  She has blood work coming up.

## 2024-04-05 NOTE — PHYSICAL EXAM
[de-identified] : Patient assessed palpation A1 pulley left ring finger.  There is clear triggering present.  There is normal sensation normal cap refill.  No erythema ecchymoses or abrasions.  No masses.  No instability.  No Dupuytren's.

## 2024-04-08 ENCOUNTER — APPOINTMENT (OUTPATIENT)
Dept: ORTHOPEDIC SURGERY | Facility: CLINIC | Age: 59
End: 2024-04-08
Payer: MEDICARE

## 2024-04-08 DIAGNOSIS — M65.342 TRIGGER FINGER, LEFT RING FINGER: ICD-10-CM

## 2024-04-08 PROCEDURE — 20550 NJX 1 TENDON SHEATH/LIGAMENT: CPT | Mod: LT

## 2024-04-08 NOTE — PHYSICAL EXAM
[de-identified] : Patient is tender to palpation A1 pulley left ring finger.  There is triggering present.  There is normal sensation normal cap refill.

## 2024-04-08 NOTE — PROCEDURE
[FreeTextEntry3] : Trigger finger injection was performed because of pain inflammation and stiffness Anesthesia: ethyl chloride sprayed topically Dexamethasone injection of 1cc 4mg/ml Lidocaine: An injection of Lidocaine 1% 1cc  Patient has tried OTC's including aspirin, Ibuprofen, Aleve etc or prescription NSAIDS, and/or exercises at home and/ or physical therapy without satisfactory response. After verbal consent using sterile preparation and technique. The risks, benefits, and alternatives to cortisone injection were explained in full to the patient. Risks outlined include but are not limited to infection, sepsis, bleeding, scarring, skin discoloration, temporary increase in pain, syncopal episode, failure to resolve symptoms, allergic reaction, symptom recurrence, and elevation of blood sugar in diabetics. Patient understood the risks. All questions were answered. After discussion of options, patient requested an injection. Oral informed consent was obtained and sterile prep was done of the injection site. Sterile technique was utilized for the procedure including the preparation of the solutions used for the injection. Patient tolerated the procedure well. Advised to ice the injection site this evening. Prep with ETOH  locally to site. Sterile technique used.  tendon sheath injected Left ring finger flexor tendon sheath

## 2024-04-08 NOTE — HISTORY OF PRESENT ILLNESS
[de-identified] : 58-year-old female with a left-sided ring finger trigger digit.  She cannot get a cortisone injection at her last visit because she was getting blood work done.  She comes in today to receive this injection.

## 2024-04-08 NOTE — ASSESSMENT
[FreeTextEntry1] : Patient is a left-sided ring finger trigger digit.  Patient received cortisone injection to the A1 pulley area today.  She tolerated well.  Will see how the injection does.  Hopefully help to relieve her symptoms.  Potential for surgery was discussed as well.

## 2024-04-12 ENCOUNTER — RX RENEWAL (OUTPATIENT)
Age: 59
End: 2024-04-12

## 2024-04-12 RX ORDER — METFORMIN HYDROCHLORIDE 850 MG/1
850 TABLET, COATED ORAL
Qty: 180 | Refills: 3 | Status: ACTIVE | COMMUNITY
Start: 2020-04-09 | End: 1900-01-01

## 2024-04-14 PROBLEM — E13.9 DIABETES MELLITUS OF OTHER TYPE WITHOUT COMPLICATION: Status: ACTIVE | Noted: 2019-07-11

## 2024-04-14 NOTE — PHYSICAL EXAM
[Alert] : alert [Well Nourished] : well nourished [No Acute Distress] : no acute distress [Well Developed] : well developed [Normal Sclera/Conjunctiva] : normal sclera/conjunctiva [EOMI] : extra ocular movement intact [No Proptosis] : no proptosis [Normal Oropharynx] : the oropharynx was normal [Well Healed Scar] : well healed scar [No Respiratory Distress] : no respiratory distress [No Accessory Muscle Use] : no accessory muscle use [Clear to Auscultation] : lungs were clear to auscultation bilaterally [Normal S1, S2] : normal S1 and S2 [Normal Rate] : heart rate was normal [Regular Rhythm] : with a regular rhythm [No Edema] : no peripheral edema [Pedal Pulses Normal] : the pedal pulses are present [Normal Bowel Sounds] : normal bowel sounds [Not Tender] : non-tender [Not Distended] : not distended [Soft] : abdomen soft [Normal Anterior Cervical Nodes] : no anterior cervical lymphadenopathy [No Spinal Tenderness] : no spinal tenderness [Spine Straight] : spine straight [No Stigmata of Cushings Syndrome] : no stigmata of Cushings Syndrome [Normal Gait] : normal gait [Normal Strength/Tone] : muscle strength and tone were normal [No Rash] : no rash [Normal Reflexes] : deep tendon reflexes were 2+ and symmetric [No Tremors] : no tremors [Oriented x3] : oriented to person, place, and time

## 2024-04-15 ENCOUNTER — APPOINTMENT (OUTPATIENT)
Dept: ENDOCRINOLOGY | Facility: CLINIC | Age: 59
End: 2024-04-15
Payer: MEDICARE

## 2024-04-15 VITALS
RESPIRATION RATE: 18 BRPM | HEART RATE: 82 BPM | DIASTOLIC BLOOD PRESSURE: 78 MMHG | HEIGHT: 64 IN | WEIGHT: 180 LBS | OXYGEN SATURATION: 98 % | SYSTOLIC BLOOD PRESSURE: 140 MMHG | BODY MASS INDEX: 30.73 KG/M2

## 2024-04-15 DIAGNOSIS — E13.9 OTHER SPECIFIED DIABETES MELLITUS W/OUT COMPLICATIONS: ICD-10-CM

## 2024-04-15 PROCEDURE — 99213 OFFICE O/P EST LOW 20 MIN: CPT

## 2024-04-15 NOTE — ASSESSMENT
[FreeTextEntry1] : The pt. has history of thyroid carcinoma with left thyroidectomy. Pt. has been followed with thyroglobulin with most current level 0.8 from 1.5 & 1.1 and imaging with U/S (10/5/2023 from 10/6/2022 c/w 4/19/2022 & 10/25/2021) with no visualization of previous small R nodule of 3 mm. There were no suspicious lymph nodes.  Goal has been to suppress TSH with normal T4 and T3 and clinical euthyroid state. Her TSH is mildly suppressed. Risks of subclinical hyperthyroid reviewed in great detail including BMD and cardiac issues. I had a long discussion with patient regarding diabetes control including home readings. Goal HbA1c was discussed (7.0) and counseling provided regarding diet/exercise and complications of diabetes (renal and cardiac and neurologic as well and need for eye exams and examination of feet. Lipids and importance of BP control also reviewed. HbA1c currently at 8.2 from 8.0, 7.7 & 6.8. Suggested increase Metformin to 1000 bid. and discussed GLP 1. but patient is resistant.  Lipid levels were reviewed with patient and importance and function of LDL, HDL and triglycerides discussed. Methods to increase HDL (exercise, fish, beans, oatmeal, legumes etc.) discussed with pt. in conjunction with measures to decrease LDL and triglycerides including diet and exercise. LDL/HDL was 104/43 from 80/43, 109/38 from 79/39. Triglycerides were 141. Current regimen of treatment with Atorvastatin 20 to continue. Risks/benefits of statins were discussed in detail.  The previous Vit B12 (1315) and D (61.2) were normal.

## 2024-04-17 ENCOUNTER — APPOINTMENT (OUTPATIENT)
Dept: ORTHOPEDIC SURGERY | Facility: CLINIC | Age: 59
End: 2024-04-17
Payer: MEDICARE

## 2024-04-17 DIAGNOSIS — S46.012A STRAIN OF MUSCLE(S) AND TENDON(S) OF THE ROTATOR CUFF OF LEFT SHOULDER, INITIAL ENCOUNTER: ICD-10-CM

## 2024-04-17 DIAGNOSIS — E04.1 NONTOXIC SINGLE THYROID NODULE: ICD-10-CM

## 2024-04-17 DIAGNOSIS — C73 MALIGNANT NEOPLASM OF THYROID GLAND: ICD-10-CM

## 2024-04-17 DIAGNOSIS — M54.12 RADICULOPATHY, CERVICAL REGION: ICD-10-CM

## 2024-04-17 DIAGNOSIS — E78.00 PURE HYPERCHOLESTEROLEMIA, UNSPECIFIED: ICD-10-CM

## 2024-04-17 DIAGNOSIS — E89.0 POSTPROCEDURAL HYPOTHYROIDISM: ICD-10-CM

## 2024-04-17 DIAGNOSIS — E11.9 TYPE 2 DIABETES MELLITUS W/OUT COMPLICATIONS: ICD-10-CM

## 2024-04-17 PROCEDURE — 73030 X-RAY EXAM OF SHOULDER: CPT | Mod: LT

## 2024-04-17 PROCEDURE — 99203 OFFICE O/P NEW LOW 30 MIN: CPT

## 2024-04-17 RX ORDER — MELOXICAM 15 MG/1
15 TABLET ORAL DAILY
Qty: 30 | Refills: 1 | Status: ACTIVE | COMMUNITY
Start: 2024-04-17 | End: 1900-01-01

## 2024-04-17 NOTE — HISTORY OF PRESENT ILLNESS
[de-identified] : cc left shoulder.   58-year-old female presents left shoulder. RHD. She states she cannot lift her arm above her head. On the pain scale at rest, it is a 7, with activity it is a 9.  He is going to [place] tomorrow and she lives there, she does  do fingersticks but has not done them recently  h/o diabetes blood work done this month glucose was 202, hemoglobin A1c was 8.2, creatinine was 0.68  X-rays were performed in the office today of the left shoulder, 3 views  on exam full range of motion with pain with impingement cuff resistance and Grimes's  recommending Nostril anti-inflammatory side effects discussed as well as physical therapy, if she continues to have pain and noticed loss of range of motion I recommend she see an orthopedic surgeon in [place] since she lives there and to get a cortisone shot

## 2024-04-23 RX ORDER — GLIMEPIRIDE 2 MG/1
2 TABLET ORAL
Qty: 180 | Refills: 3 | Status: ACTIVE | COMMUNITY
Start: 1900-01-01 | End: 1900-01-01

## 2024-04-23 RX ORDER — LEVOTHYROXINE SODIUM 0.15 MG/1
150 TABLET ORAL DAILY
Qty: 90 | Refills: 3 | Status: ACTIVE | COMMUNITY
Start: 2021-10-28 | End: 1900-01-01

## 2024-07-25 ENCOUNTER — NON-APPOINTMENT (OUTPATIENT)
Age: 59
End: 2024-07-25

## 2024-07-25 ENCOUNTER — APPOINTMENT (OUTPATIENT)
Dept: PULMONOLOGY | Facility: CLINIC | Age: 59
End: 2024-07-25
Payer: MEDICARE

## 2024-07-25 VITALS
BODY MASS INDEX: 29.88 KG/M2 | HEART RATE: 108 BPM | DIASTOLIC BLOOD PRESSURE: 80 MMHG | HEIGHT: 64 IN | RESPIRATION RATE: 12 BRPM | WEIGHT: 175 LBS | OXYGEN SATURATION: 97 % | SYSTOLIC BLOOD PRESSURE: 160 MMHG

## 2024-07-25 DIAGNOSIS — G47.33 OBSTRUCTIVE SLEEP APNEA (ADULT) (PEDIATRIC): ICD-10-CM

## 2024-07-25 DIAGNOSIS — R91.8 OTHER NONSPECIFIC ABNORMAL FINDING OF LUNG FIELD: ICD-10-CM

## 2024-07-25 PROCEDURE — 94010 BREATHING CAPACITY TEST: CPT

## 2024-07-25 PROCEDURE — 71046 X-RAY EXAM CHEST 2 VIEWS: CPT

## 2024-07-25 PROCEDURE — 99213 OFFICE O/P EST LOW 20 MIN: CPT | Mod: 25

## 2024-07-25 NOTE — PROCEDURE
[FreeTextEntry1] : CXR PA and Lateral  The costophrenic and cardiophrenic angles are sharp The edwin parenchyma shows no infiltrates, consolidations, or nodules  The Mediastinum is within normal limits No pleural effusions

## 2024-09-26 ENCOUNTER — TRANSCRIPTION ENCOUNTER (OUTPATIENT)
Age: 59
End: 2024-09-26

## 2024-10-10 ENCOUNTER — APPOINTMENT (OUTPATIENT)
Dept: ENDOCRINOLOGY | Facility: CLINIC | Age: 59
End: 2024-10-10
Payer: MEDICARE

## 2024-10-10 DIAGNOSIS — E13.9 OTHER SPECIFIED DIABETES MELLITUS W/OUT COMPLICATIONS: ICD-10-CM

## 2024-10-10 DIAGNOSIS — E11.9 TYPE 2 DIABETES MELLITUS W/OUT COMPLICATIONS: ICD-10-CM

## 2024-10-10 DIAGNOSIS — E78.00 PURE HYPERCHOLESTEROLEMIA, UNSPECIFIED: ICD-10-CM

## 2024-10-10 DIAGNOSIS — C73 MALIGNANT NEOPLASM OF THYROID GLAND: ICD-10-CM

## 2024-10-10 DIAGNOSIS — E89.0 POSTPROCEDURAL HYPOTHYROIDISM: ICD-10-CM

## 2024-10-10 PROCEDURE — 99442: CPT | Mod: 93

## 2025-04-13 NOTE — ED PROVIDER NOTE - OBJECTIVE STATEMENT
52 yo F reports with L buttock pain that began earlier today after pt [place] the [place]. Reports the pain is radiating down the left leg feels electrical like sensation radiating down lateral side of left thigh. Denies hematuria, urgency, frequency, abd pain, n/v, fevers, chills, h/o kidney stones.    I have reviewed available current nursing and previous documentation of past medical, surgical, family, and/or social history. MED

## 2025-04-14 ENCOUNTER — RX RENEWAL (OUTPATIENT)
Age: 60
End: 2025-04-14

## 2025-04-19 PROBLEM — E83.52 HYPERCALCEMIA: Status: ACTIVE | Noted: 2025-04-19

## 2025-04-21 ENCOUNTER — APPOINTMENT (OUTPATIENT)
Dept: ENDOCRINOLOGY | Facility: CLINIC | Age: 60
End: 2025-04-21
Payer: COMMERCIAL

## 2025-04-21 VITALS
OXYGEN SATURATION: 98 % | WEIGHT: 185 LBS | HEART RATE: 78 BPM | DIASTOLIC BLOOD PRESSURE: 74 MMHG | BODY MASS INDEX: 31.58 KG/M2 | SYSTOLIC BLOOD PRESSURE: 138 MMHG | HEIGHT: 64 IN

## 2025-04-21 DIAGNOSIS — E13.9 OTHER SPECIFIED DIABETES MELLITUS W/OUT COMPLICATIONS: ICD-10-CM

## 2025-04-21 DIAGNOSIS — E83.52 HYPERCALCEMIA: ICD-10-CM

## 2025-04-21 DIAGNOSIS — E04.1 NONTOXIC SINGLE THYROID NODULE: ICD-10-CM

## 2025-04-21 DIAGNOSIS — C73 MALIGNANT NEOPLASM OF THYROID GLAND: ICD-10-CM

## 2025-04-21 DIAGNOSIS — E78.00 PURE HYPERCHOLESTEROLEMIA, UNSPECIFIED: ICD-10-CM

## 2025-04-21 DIAGNOSIS — E89.0 POSTPROCEDURAL HYPOTHYROIDISM: ICD-10-CM

## 2025-04-21 PROCEDURE — 99214 OFFICE O/P EST MOD 30 MIN: CPT

## 2025-05-14 ENCOUNTER — RX RENEWAL (OUTPATIENT)
Age: 60
End: 2025-05-14